# Patient Record
Sex: FEMALE | Race: OTHER | NOT HISPANIC OR LATINO | URBAN - METROPOLITAN AREA
[De-identification: names, ages, dates, MRNs, and addresses within clinical notes are randomized per-mention and may not be internally consistent; named-entity substitution may affect disease eponyms.]

---

## 2017-07-05 NOTE — PATIENT PROFILE ADULT. - PSH
H/O tubal ligation    History of cholecystectomy    History of surgery  elbow  History of umbilical hernia repair

## 2017-07-06 ENCOUNTER — INPATIENT (INPATIENT)
Facility: HOSPITAL | Age: 47
LOS: 3 days | Discharge: HOME | DRG: 330 | End: 2017-07-10
Attending: SURGERY | Admitting: SURGERY
Payer: COMMERCIAL

## 2017-07-06 VITALS
DIASTOLIC BLOOD PRESSURE: 83 MMHG | RESPIRATION RATE: 16 BRPM | HEART RATE: 70 BPM | OXYGEN SATURATION: 99 % | SYSTOLIC BLOOD PRESSURE: 139 MMHG | HEIGHT: 65 IN | WEIGHT: 144.18 LBS | TEMPERATURE: 98 F

## 2017-07-06 DIAGNOSIS — K57.92 DIVERTICULITIS OF INTESTINE, PART UNSPECIFIED, WITHOUT PERFORATION OR ABSCESS WITHOUT BLEEDING: ICD-10-CM

## 2017-07-06 DIAGNOSIS — Z98.51 TUBAL LIGATION STATUS: Chronic | ICD-10-CM

## 2017-07-06 DIAGNOSIS — Z98.890 OTHER SPECIFIED POSTPROCEDURAL STATES: Chronic | ICD-10-CM

## 2017-07-06 DIAGNOSIS — Z90.49 ACQUIRED ABSENCE OF OTHER SPECIFIED PARTS OF DIGESTIVE TRACT: Chronic | ICD-10-CM

## 2017-07-06 LAB
ALBUMIN SERPL ELPH-MCNC: 3.4 G/DL — SIGNIFICANT CHANGE UP (ref 3.3–5)
ALP SERPL-CCNC: 41 U/L — SIGNIFICANT CHANGE UP (ref 40–120)
ALT FLD-CCNC: 16 U/L — SIGNIFICANT CHANGE UP (ref 10–45)
ANION GAP SERPL CALC-SCNC: 10 MMOL/L — SIGNIFICANT CHANGE UP (ref 5–17)
AST SERPL-CCNC: 26 U/L — SIGNIFICANT CHANGE UP (ref 10–40)
BILIRUB SERPL-MCNC: 0.8 MG/DL — SIGNIFICANT CHANGE UP (ref 0.2–1.2)
BUN SERPL-MCNC: 7 MG/DL — SIGNIFICANT CHANGE UP (ref 7–23)
CALCIUM SERPL-MCNC: 8.5 MG/DL — SIGNIFICANT CHANGE UP (ref 8.4–10.5)
CHLORIDE SERPL-SCNC: 101 MMOL/L — SIGNIFICANT CHANGE UP (ref 96–108)
CO2 SERPL-SCNC: 25 MMOL/L — SIGNIFICANT CHANGE UP (ref 22–31)
CREAT SERPL-MCNC: 0.7 MG/DL — SIGNIFICANT CHANGE UP (ref 0.5–1.3)
GLUCOSE SERPL-MCNC: 138 MG/DL — HIGH (ref 70–99)
HCT VFR BLD CALC: 38.1 % — SIGNIFICANT CHANGE UP (ref 34.5–45)
HGB BLD-MCNC: 12.9 G/DL — SIGNIFICANT CHANGE UP (ref 11.5–15.5)
MAGNESIUM SERPL-MCNC: 1.7 MG/DL — SIGNIFICANT CHANGE UP (ref 1.6–2.6)
MCHC RBC-ENTMCNC: 30.2 PG — SIGNIFICANT CHANGE UP (ref 27–34)
MCHC RBC-ENTMCNC: 33.9 G/DL — SIGNIFICANT CHANGE UP (ref 32–36)
MCV RBC AUTO: 89.2 FL — SIGNIFICANT CHANGE UP (ref 80–100)
PHOSPHATE SERPL-MCNC: 2.9 MG/DL — SIGNIFICANT CHANGE UP (ref 2.5–4.5)
PLATELET # BLD AUTO: 195 K/UL — SIGNIFICANT CHANGE UP (ref 150–400)
POTASSIUM SERPL-MCNC: 4.1 MMOL/L — SIGNIFICANT CHANGE UP (ref 3.5–5.3)
POTASSIUM SERPL-SCNC: 4.1 MMOL/L — SIGNIFICANT CHANGE UP (ref 3.5–5.3)
PROT SERPL-MCNC: 5.5 G/DL — LOW (ref 6–8.3)
RBC # BLD: 4.27 M/UL — SIGNIFICANT CHANGE UP (ref 3.8–5.2)
RBC # FLD: 12.2 % — SIGNIFICANT CHANGE UP (ref 10.3–16.9)
SODIUM SERPL-SCNC: 136 MMOL/L — SIGNIFICANT CHANGE UP (ref 135–145)
WBC # BLD: 13.5 K/UL — HIGH (ref 3.8–10.5)
WBC # FLD AUTO: 13.5 K/UL — HIGH (ref 3.8–10.5)

## 2017-07-06 RX ORDER — HEPARIN SODIUM 5000 [USP'U]/ML
5000 INJECTION INTRAVENOUS; SUBCUTANEOUS EVERY 8 HOURS
Qty: 0 | Refills: 0 | Status: DISCONTINUED | OUTPATIENT
Start: 2017-07-07 | End: 2017-07-10

## 2017-07-06 RX ORDER — PIPERACILLIN AND TAZOBACTAM 4; .5 G/20ML; G/20ML
INJECTION, POWDER, LYOPHILIZED, FOR SOLUTION INTRAVENOUS
Qty: 0 | Refills: 0 | Status: DISCONTINUED | OUTPATIENT
Start: 2017-07-06 | End: 2017-07-06

## 2017-07-06 RX ORDER — ONDANSETRON 8 MG/1
4 TABLET, FILM COATED ORAL EVERY 6 HOURS
Qty: 0 | Refills: 0 | Status: DISCONTINUED | OUTPATIENT
Start: 2017-07-06 | End: 2017-07-10

## 2017-07-06 RX ORDER — HYDROMORPHONE HYDROCHLORIDE 2 MG/ML
30 INJECTION INTRAMUSCULAR; INTRAVENOUS; SUBCUTANEOUS
Qty: 0 | Refills: 0 | Status: DISCONTINUED | OUTPATIENT
Start: 2017-07-06 | End: 2017-07-08

## 2017-07-06 RX ORDER — SODIUM CHLORIDE 9 MG/ML
1000 INJECTION, SOLUTION INTRAVENOUS
Qty: 0 | Refills: 0 | Status: DISCONTINUED | OUTPATIENT
Start: 2017-07-06 | End: 2017-07-10

## 2017-07-06 RX ORDER — PIPERACILLIN AND TAZOBACTAM 4; .5 G/20ML; G/20ML
3.38 INJECTION, POWDER, LYOPHILIZED, FOR SOLUTION INTRAVENOUS ONCE
Qty: 0 | Refills: 0 | Status: COMPLETED | OUTPATIENT
Start: 2017-07-06 | End: 2017-07-06

## 2017-07-06 RX ORDER — PIPERACILLIN AND TAZOBACTAM 4; .5 G/20ML; G/20ML
3.38 INJECTION, POWDER, LYOPHILIZED, FOR SOLUTION INTRAVENOUS EVERY 6 HOURS
Qty: 0 | Refills: 0 | Status: DISCONTINUED | OUTPATIENT
Start: 2017-07-07 | End: 2017-07-10

## 2017-07-06 RX ORDER — MAGNESIUM SULFATE 500 MG/ML
1 VIAL (ML) INJECTION ONCE
Qty: 0 | Refills: 0 | Status: COMPLETED | OUTPATIENT
Start: 2017-07-06 | End: 2017-07-07

## 2017-07-06 RX ORDER — NALOXONE HYDROCHLORIDE 4 MG/.1ML
0.1 SPRAY NASAL
Qty: 0 | Refills: 0 | Status: DISCONTINUED | OUTPATIENT
Start: 2017-07-06 | End: 2017-07-10

## 2017-07-06 RX ORDER — SODIUM CHLORIDE 9 MG/ML
1000 INJECTION, SOLUTION INTRAVENOUS ONCE
Qty: 0 | Refills: 0 | Status: COMPLETED | OUTPATIENT
Start: 2017-07-06 | End: 2017-07-06

## 2017-07-06 RX ORDER — PIPERACILLIN AND TAZOBACTAM 4; .5 G/20ML; G/20ML
INJECTION, POWDER, LYOPHILIZED, FOR SOLUTION INTRAVENOUS
Qty: 0 | Refills: 0 | Status: DISCONTINUED | OUTPATIENT
Start: 2017-07-06 | End: 2017-07-10

## 2017-07-06 RX ADMIN — SODIUM CHLORIDE 1 MILLILITER(S): 9 INJECTION, SOLUTION INTRAVENOUS at 20:57

## 2017-07-06 RX ADMIN — PIPERACILLIN AND TAZOBACTAM 200 GRAM(S): 4; .5 INJECTION, POWDER, LYOPHILIZED, FOR SOLUTION INTRAVENOUS at 20:58

## 2017-07-06 NOTE — BRIEF OPERATIVE NOTE - POST-OP DX
Diverticulitis  07/06/2017    Active  Varghese, Rina GAMBLE  Meckels diverticulum  07/06/2017    Active  Varghese, Rina GAMBLE

## 2017-07-06 NOTE — H&P ADULT - HISTORY OF PRESENT ILLNESS
47 yo F with recurrent bouts of diverticulitis that have failed medical management presents for elective sigmoidectomy

## 2017-07-06 NOTE — BRIEF OPERATIVE NOTE - OPERATION/FINDINGS
Procedure: laparoscopic assisted sigmoidectomy with colorectal anastomosis, small bowel resection with side to side functional end to end anastomosis    Findings: inflammation of sigmoid, retained stone (likely from cholecystectomy), meckels diverticulum.  Anastomosis evaluated with proctoscopy- no air leak

## 2017-07-07 LAB
ANION GAP SERPL CALC-SCNC: 11 MMOL/L — SIGNIFICANT CHANGE UP (ref 5–17)
BUN SERPL-MCNC: 6 MG/DL — LOW (ref 7–23)
CALCIUM SERPL-MCNC: 8.5 MG/DL — SIGNIFICANT CHANGE UP (ref 8.4–10.5)
CHLORIDE SERPL-SCNC: 100 MMOL/L — SIGNIFICANT CHANGE UP (ref 96–108)
CO2 SERPL-SCNC: 25 MMOL/L — SIGNIFICANT CHANGE UP (ref 22–31)
CREAT SERPL-MCNC: 0.8 MG/DL — SIGNIFICANT CHANGE UP (ref 0.5–1.3)
GLUCOSE SERPL-MCNC: 112 MG/DL — HIGH (ref 70–99)
HCT VFR BLD CALC: 37.6 % — SIGNIFICANT CHANGE UP (ref 34.5–45)
HGB BLD-MCNC: 12.9 G/DL — SIGNIFICANT CHANGE UP (ref 11.5–15.5)
MAGNESIUM SERPL-MCNC: 2 MG/DL — SIGNIFICANT CHANGE UP (ref 1.6–2.6)
MCHC RBC-ENTMCNC: 30.6 PG — SIGNIFICANT CHANGE UP (ref 27–34)
MCHC RBC-ENTMCNC: 34.3 G/DL — SIGNIFICANT CHANGE UP (ref 32–36)
MCV RBC AUTO: 89.1 FL — SIGNIFICANT CHANGE UP (ref 80–100)
PHOSPHATE SERPL-MCNC: 2.7 MG/DL — SIGNIFICANT CHANGE UP (ref 2.5–4.5)
PLATELET # BLD AUTO: 189 K/UL — SIGNIFICANT CHANGE UP (ref 150–400)
POTASSIUM SERPL-MCNC: 4.2 MMOL/L — SIGNIFICANT CHANGE UP (ref 3.5–5.3)
POTASSIUM SERPL-SCNC: 4.2 MMOL/L — SIGNIFICANT CHANGE UP (ref 3.5–5.3)
RBC # BLD: 4.22 M/UL — SIGNIFICANT CHANGE UP (ref 3.8–5.2)
RBC # FLD: 12.8 % — SIGNIFICANT CHANGE UP (ref 10.3–16.9)
SODIUM SERPL-SCNC: 136 MMOL/L — SIGNIFICANT CHANGE UP (ref 135–145)
WBC # BLD: 12.5 K/UL — HIGH (ref 3.8–10.5)
WBC # FLD AUTO: 12.5 K/UL — HIGH (ref 3.8–10.5)

## 2017-07-07 RX ORDER — ACETAMINOPHEN 500 MG
1000 TABLET ORAL ONCE
Qty: 0 | Refills: 0 | Status: COMPLETED | OUTPATIENT
Start: 2017-07-07 | End: 2017-07-07

## 2017-07-07 RX ORDER — KETOROLAC TROMETHAMINE 30 MG/ML
30 SYRINGE (ML) INJECTION EVERY 6 HOURS
Qty: 0 | Refills: 0 | Status: DISCONTINUED | OUTPATIENT
Start: 2017-07-07 | End: 2017-07-10

## 2017-07-07 RX ADMIN — PIPERACILLIN AND TAZOBACTAM 200 GRAM(S): 4; .5 INJECTION, POWDER, LYOPHILIZED, FOR SOLUTION INTRAVENOUS at 07:59

## 2017-07-07 RX ADMIN — Medication 30 MILLIGRAM(S): at 18:16

## 2017-07-07 RX ADMIN — Medication 1000 MILLIGRAM(S): at 07:58

## 2017-07-07 RX ADMIN — PIPERACILLIN AND TAZOBACTAM 200 GRAM(S): 4; .5 INJECTION, POWDER, LYOPHILIZED, FOR SOLUTION INTRAVENOUS at 20:02

## 2017-07-07 RX ADMIN — HYDROMORPHONE HYDROCHLORIDE 30 MILLILITER(S): 2 INJECTION INTRAMUSCULAR; INTRAVENOUS; SUBCUTANEOUS at 17:09

## 2017-07-07 RX ADMIN — ONDANSETRON 4 MILLIGRAM(S): 8 TABLET, FILM COATED ORAL at 05:03

## 2017-07-07 RX ADMIN — HEPARIN SODIUM 5000 UNIT(S): 5000 INJECTION INTRAVENOUS; SUBCUTANEOUS at 05:03

## 2017-07-07 RX ADMIN — HEPARIN SODIUM 5000 UNIT(S): 5000 INJECTION INTRAVENOUS; SUBCUTANEOUS at 23:11

## 2017-07-07 RX ADMIN — Medication 30 MILLIGRAM(S): at 23:25

## 2017-07-07 RX ADMIN — Medication 400 MILLIGRAM(S): at 07:43

## 2017-07-07 RX ADMIN — PIPERACILLIN AND TAZOBACTAM 200 GRAM(S): 4; .5 INJECTION, POWDER, LYOPHILIZED, FOR SOLUTION INTRAVENOUS at 01:54

## 2017-07-07 RX ADMIN — SODIUM CHLORIDE 150 MILLILITER(S): 9 INJECTION, SOLUTION INTRAVENOUS at 05:03

## 2017-07-07 RX ADMIN — HEPARIN SODIUM 5000 UNIT(S): 5000 INJECTION INTRAVENOUS; SUBCUTANEOUS at 13:57

## 2017-07-07 RX ADMIN — Medication 30 MILLIGRAM(S): at 18:01

## 2017-07-07 RX ADMIN — ONDANSETRON 4 MILLIGRAM(S): 8 TABLET, FILM COATED ORAL at 15:53

## 2017-07-07 RX ADMIN — Medication 30 MILLIGRAM(S): at 23:11

## 2017-07-07 RX ADMIN — SODIUM CHLORIDE 150 MILLILITER(S): 9 INJECTION, SOLUTION INTRAVENOUS at 00:14

## 2017-07-07 RX ADMIN — Medication 100 GRAM(S): at 00:04

## 2017-07-07 RX ADMIN — PIPERACILLIN AND TAZOBACTAM 200 GRAM(S): 4; .5 INJECTION, POWDER, LYOPHILIZED, FOR SOLUTION INTRAVENOUS at 13:57

## 2017-07-07 RX ADMIN — Medication 400 MILLIGRAM(S): at 18:01

## 2017-07-07 NOTE — PROGRESS NOTE ADULT - SUBJECTIVE AND OBJECTIVE BOX
O/N:  POC wnl. low UO, gave 1L Bolus. inc IVF @ 150.  UO picked up. ordered SQH for am. postop labs as per Dr. Charles WBC 13.5 but otherwise wnl. T.max 100.2 encouraged IS.  6/6: s/p Lap sig and SBR    status post: lap sig and SBR    POD #1 O/N:  POC wnl. low UO, gave 1L Bolus. inc IVF @ 150.  UO picked up. ordered SQH for am. postop labs as per Dr. Charles WBC 13.5 but otherwise wnl. T.max 100.2 encouraged IS.  6/6: s/p Lap sig and SBR    status post: lap sig and SBR    POD #1     SUBJECTIVE: Patient seen and examined bedside by chief resident. C/o some mild abdominal pain.     piperacillin/tazobactam IVPB.   IV Intermittent   piperacillin/tazobactam IVPB. 3.375 Gram(s) IV Intermittent every 6 hours  heparin  Injectable 5000 Unit(s) SubCutaneous every 8 hours      Vital Signs Last 24 Hrs  T(C): 37.3 (07 Jul 2017 09:23), Max: 37.9 (07 Jul 2017 00:53)  T(F): 99.1 (07 Jul 2017 09:23), Max: 100.2 (07 Jul 2017 00:53)  HR: 64 (07 Jul 2017 09:23) (64 - 81)  BP: 141/80 (07 Jul 2017 09:23) (126/69 - 141/80)  BP(mean): --  RR: 17 (07 Jul 2017 09:23) (16 - 17)  SpO2: 98% (07 Jul 2017 09:23) (98% - 99%)  I&O's Detail    06 Jul 2017 07:01  -  07 Jul 2017 07:00  --------------------------------------------------------  IN:    IV PiggyBack: 250 mL    Lactated Ringers IV Bolus: 1000 mL    lactated ringers.: 1650 mL  Total IN: 2900 mL    OUT:    Indwelling Catheter - Urethral: 650 mL  Total OUT: 650 mL    Total NET: 2250 mL      General: NAD, resting comfortably in bed  C/V: NSR  Pulm: Nonlabored breathing, no respiratory distress  Abd: soft, NT/ND. Incisions are C/D/I  Extrem: WWP, no edema, SCDs in place        LABS:                        12.9   12.5  )-----------( 189      ( 07 Jul 2017 06:46 )             37.6     07-07    136  |  100  |  6<L>  ----------------------------<  112<H>  4.2   |  25  |  0.80    Ca    8.5      07 Jul 2017 06:46  Phos  2.7     07-07  Mg     2.0     07-07    TPro  5.5<L>  /  Alb  3.4  /  TBili  0.8  /  DBili  x   /  AST  26  /  ALT  16  /  AlkPhos  41  07-06

## 2017-07-07 NOTE — PROGRESS NOTE ADULT - ASSESSMENT
47 y/o F s/p Lap sig and SBR    NPO/IVF  PCA  nausea control  Iman Schaefer to gravity  AM labs  Start HSQ 7/7  OOBA/IS

## 2017-07-07 NOTE — PROGRESS NOTE ADULT - SUBJECTIVE AND OBJECTIVE BOX
INTERVAL HPI/OVERNIGHT EVENTS:   SURGERY ATTENDING    STATUS POST:  LAPAROSCOPIC MOBILISATION OF SPLENIC FLEXURE, SIGMOIDECTOMY WITH LOW COLORECTAL ANASTOMOSIS, RIGID PROCTOCOLONOSCOPY, SMALL BOWEL RESECTION WITH ANASTOMOSIS.    POST OPERATIVE DAY #: 1    SUBJECTIVE:  Flatus: [ ] YES [X ] NO             Bowel Movement: [ ] YES [X ] NO  Pain (0-10):       5     Pain Control Adequate: [X ] YES [ ] NO  Nausea: [ ] YES [X ] NO            Vomiting: [ ] YES [X ] NO  Diarrhea: [ ] YES [X ] NO         Constipation: [ ] YES [X ] NO     Chest Pain: [ ] YES [X ] NO    SOB:  [ ] YES [X ] NO    MEDICATIONS  (STANDING):  piperacillin/tazobactam IVPB.   IV Intermittent   piperacillin/tazobactam IVPB. 3.375 Gram(s) IV Intermittent every 6 hours  lactated ringers. 1000 milliLiter(s) (125 mL/Hr) IV Continuous <Continuous>  HYDROmorphone PCA (1 mG/mL) 30 milliLiter(s) PCA Continuous PCA Continuous  heparin  Injectable 5000 Unit(s) SubCutaneous every 8 hours  acetaminophen  IVPB. 1000 milliGRAM(s) IV Intermittent once  ketorolac   Injectable 30 milliGRAM(s) IV Push every 6 hours    MEDICATIONS  (PRN):  ondansetron Injectable 4 milliGRAM(s) IV Push every 6 hours PRN Nausea  naloxone Injectable 0.1 milliGRAM(s) IV Push every 3 minutes PRN For ANY of the following changes in patient status:  A. RR LESS THAN 10 breaths per minute, B. Oxygen saturation LESS THAN 90%, C. Sedation score of 6      Vital Signs Last 24 Hrs  T(C): 37.6 (07 Jul 2017 17:12), Max: 37.9 (07 Jul 2017 00:53)  T(F): 99.7 (07 Jul 2017 17:12), Max: 100.2 (07 Jul 2017 00:53)  HR: 66 (07 Jul 2017 17:12) (64 - 81)  BP: 151/87 (07 Jul 2017 17:12) (126/69 - 151/87)  BP(mean): --  RR: 17 (07 Jul 2017 17:12) (16 - 17)  SpO2: 98% (07 Jul 2017 17:12) (98% - 99%)          I&O's Detail    06 Jul 2017 07:01  -  07 Jul 2017 07:00  --------------------------------------------------------  IN:    IV PiggyBack: 250 mL    Lactated Ringers IV Bolus: 1000 mL    lactated ringers.: 1650 mL  Total IN: 2900 mL    OUT:    Indwelling Catheter - Urethral: 650 mL  Total OUT: 650 mL    Total NET: 2250 mL      07 Jul 2017 07:01  -  07 Jul 2017 17:37  --------------------------------------------------------  IN:  Total IN: 0 mL    OUT:    Indwelling Catheter - Urethral: 1725 mL  Total OUT: 1725 mL    Total NET: -1725 mL          LABS:                        12.9   12.5  )-----------( 189      ( 07 Jul 2017 06:46 )             37.6     07-07    136  |  100  |  6<L>  ----------------------------<  112<H>  4.2   |  25  |  0.80    Ca    8.5      07 Jul 2017 06:46  Phos  2.7     07-07  Mg     2.0     07-07    TPro  5.5<L>  /  Alb  3.4  /  TBili  0.8  /  DBili  x   /  AST  26  /  ALT  16  /  AlkPhos  41  07-06          RADIOLOGY & ADDITIONAL STUDIES:

## 2017-07-07 NOTE — PROGRESS NOTE ADULT - ASSESSMENT
ABD SOFT, BS-, FLATUS-, BM-, WOUNDS DRY CLEAN INTACT.  NPO, IVF, IV ABX, DVT PROFILAXIS, SPIROMETRY, OOB.

## 2017-07-08 LAB
ANION GAP SERPL CALC-SCNC: 14 MMOL/L — SIGNIFICANT CHANGE UP (ref 5–17)
BUN SERPL-MCNC: 7 MG/DL — SIGNIFICANT CHANGE UP (ref 7–23)
CALCIUM SERPL-MCNC: 8.7 MG/DL — SIGNIFICANT CHANGE UP (ref 8.4–10.5)
CHLORIDE SERPL-SCNC: 99 MMOL/L — SIGNIFICANT CHANGE UP (ref 96–108)
CO2 SERPL-SCNC: 25 MMOL/L — SIGNIFICANT CHANGE UP (ref 22–31)
CREAT SERPL-MCNC: 0.7 MG/DL — SIGNIFICANT CHANGE UP (ref 0.5–1.3)
GLUCOSE SERPL-MCNC: 121 MG/DL — HIGH (ref 70–99)
HCT VFR BLD CALC: 38.1 % — SIGNIFICANT CHANGE UP (ref 34.5–45)
HGB BLD-MCNC: 13.3 G/DL — SIGNIFICANT CHANGE UP (ref 11.5–15.5)
MAGNESIUM SERPL-MCNC: 1.8 MG/DL — SIGNIFICANT CHANGE UP (ref 1.6–2.6)
MCHC RBC-ENTMCNC: 30.9 PG — SIGNIFICANT CHANGE UP (ref 27–34)
MCHC RBC-ENTMCNC: 34.9 G/DL — SIGNIFICANT CHANGE UP (ref 32–36)
MCV RBC AUTO: 88.6 FL — SIGNIFICANT CHANGE UP (ref 80–100)
PHOSPHATE SERPL-MCNC: 1.9 MG/DL — LOW (ref 2.5–4.5)
PLATELET # BLD AUTO: 178 K/UL — SIGNIFICANT CHANGE UP (ref 150–400)
POTASSIUM SERPL-MCNC: 3.6 MMOL/L — SIGNIFICANT CHANGE UP (ref 3.5–5.3)
POTASSIUM SERPL-SCNC: 3.6 MMOL/L — SIGNIFICANT CHANGE UP (ref 3.5–5.3)
RBC # BLD: 4.3 M/UL — SIGNIFICANT CHANGE UP (ref 3.8–5.2)
RBC # FLD: 12.6 % — SIGNIFICANT CHANGE UP (ref 10.3–16.9)
SODIUM SERPL-SCNC: 138 MMOL/L — SIGNIFICANT CHANGE UP (ref 135–145)
WBC # BLD: 11.6 K/UL — HIGH (ref 3.8–10.5)
WBC # FLD AUTO: 11.6 K/UL — HIGH (ref 3.8–10.5)

## 2017-07-08 PROCEDURE — 71010: CPT | Mod: 26

## 2017-07-08 PROCEDURE — 74010: CPT | Mod: 26

## 2017-07-08 RX ORDER — TETRACAINE/BENZOCAINE/BUTAMBEN 2%-14%-2%
1 OINTMENT (GRAM) TOPICAL ONCE
Qty: 0 | Refills: 0 | Status: COMPLETED | OUTPATIENT
Start: 2017-07-08 | End: 2017-07-08

## 2017-07-08 RX ORDER — BENZOCAINE AND MENTHOL 5; 1 G/100ML; G/100ML
1 LIQUID ORAL ONCE
Qty: 0 | Refills: 0 | Status: COMPLETED | OUTPATIENT
Start: 2017-07-08 | End: 2017-07-08

## 2017-07-08 RX ADMIN — PIPERACILLIN AND TAZOBACTAM 200 GRAM(S): 4; .5 INJECTION, POWDER, LYOPHILIZED, FOR SOLUTION INTRAVENOUS at 19:24

## 2017-07-08 RX ADMIN — PIPERACILLIN AND TAZOBACTAM 200 GRAM(S): 4; .5 INJECTION, POWDER, LYOPHILIZED, FOR SOLUTION INTRAVENOUS at 01:24

## 2017-07-08 RX ADMIN — ONDANSETRON 4 MILLIGRAM(S): 8 TABLET, FILM COATED ORAL at 10:48

## 2017-07-08 RX ADMIN — PIPERACILLIN AND TAZOBACTAM 200 GRAM(S): 4; .5 INJECTION, POWDER, LYOPHILIZED, FOR SOLUTION INTRAVENOUS at 07:03

## 2017-07-08 RX ADMIN — SODIUM CHLORIDE 150 MILLILITER(S): 9 INJECTION, SOLUTION INTRAVENOUS at 13:24

## 2017-07-08 RX ADMIN — BENZOCAINE AND MENTHOL 1 LOZENGE: 5; 1 LIQUID ORAL at 18:25

## 2017-07-08 RX ADMIN — HEPARIN SODIUM 5000 UNIT(S): 5000 INJECTION INTRAVENOUS; SUBCUTANEOUS at 05:34

## 2017-07-08 RX ADMIN — PIPERACILLIN AND TAZOBACTAM 200 GRAM(S): 4; .5 INJECTION, POWDER, LYOPHILIZED, FOR SOLUTION INTRAVENOUS at 13:24

## 2017-07-08 RX ADMIN — Medication 30 MILLIGRAM(S): at 19:39

## 2017-07-08 RX ADMIN — Medication 30 MILLIGRAM(S): at 12:30

## 2017-07-08 RX ADMIN — ONDANSETRON 4 MILLIGRAM(S): 8 TABLET, FILM COATED ORAL at 04:57

## 2017-07-08 RX ADMIN — HEPARIN SODIUM 5000 UNIT(S): 5000 INJECTION INTRAVENOUS; SUBCUTANEOUS at 13:24

## 2017-07-08 RX ADMIN — Medication 30 MILLIGRAM(S): at 23:14

## 2017-07-08 RX ADMIN — HEPARIN SODIUM 5000 UNIT(S): 5000 INJECTION INTRAVENOUS; SUBCUTANEOUS at 23:14

## 2017-07-08 RX ADMIN — Medication 30 MILLIGRAM(S): at 23:30

## 2017-07-08 RX ADMIN — Medication 30 MILLIGRAM(S): at 05:45

## 2017-07-08 RX ADMIN — Medication 1 SPRAY(S): at 12:15

## 2017-07-08 RX ADMIN — Medication 30 MILLIGRAM(S): at 12:15

## 2017-07-08 RX ADMIN — Medication 30 MILLIGRAM(S): at 05:34

## 2017-07-08 NOTE — PROGRESS NOTE ADULT - SUBJECTIVE AND OBJECTIVE BOX
INTERVAL HPI/OVERNIGHT EVENTS:   SURGERY ATTENDING    STATUS POST:      LAPAROSCOPIC MOBILISATION OF SPLENIC FLEXURE, SIGMOIDECTOMY WITH LOW COLORECTAL ANASTOMOSIS, RIGID PROCTOCOLONOSCOPY, SMALL BOWEL RESECTION WITH ANASTOMOSIS.        POST OPERATIVE DAY #: 2    SUBJECTIVE:  Flatus: [ ] YES [X ] NO             Bowel Movement: [ ] YES [X ] NO  Pain (0-10):        3    Pain Control Adequate: [X ] YES [ ] NO  Nausea: [ ] YES [ X] NO            Vomiting: [ ] YES [X ] NO  Diarrhea: [ ] YES [ X] NO         Constipation: [ ] YES [X ] NO     Chest Pain: [ ] YES [X ] NO    SOB:  [ ] YES [X ] NO    MEDICATIONS  (STANDING):  piperacillin/tazobactam IVPB.   IV Intermittent   piperacillin/tazobactam IVPB. 3.375 Gram(s) IV Intermittent every 6 hours  lactated ringers. 1000 milliLiter(s) (150 mL/Hr) IV Continuous <Continuous>  heparin  Injectable 5000 Unit(s) SubCutaneous every 8 hours  ketorolac   Injectable 30 milliGRAM(s) IV Push every 6 hours  benzocaine 15 mG/menthol 3.6 mG Lozenge 1 Lozenge Oral once    MEDICATIONS  (PRN):  ondansetron Injectable 4 milliGRAM(s) IV Push every 6 hours PRN Nausea  naloxone Injectable 0.1 milliGRAM(s) IV Push every 3 minutes PRN For ANY of the following changes in patient status:  A. RR LESS THAN 10 breaths per minute, B. Oxygen saturation LESS THAN 90%, C. Sedation score of 6      Vital Signs Last 24 Hrs  T(C): 37.2 (08 Jul 2017 12:54), Max: 37.6 (07 Jul 2017 17:12)  T(F): 98.9 (08 Jul 2017 12:54), Max: 99.7 (07 Jul 2017 17:12)  HR: 77 (08 Jul 2017 12:54) (66 - 77)  BP: 108/67 (08 Jul 2017 12:54) (108/67 - 151/87)  BP(mean): --  RR: 16 (08 Jul 2017 12:54) (16 - 17)  SpO2: 95% (08 Jul 2017 12:54) (95% - 99%)    PHYSICAL EXAM:      Constitutional:    Eyes:    ENMT:    Neck:    Breasts:    Back:    Respiratory:    Cardiovascular:    Gastrointestinal:    Genitourinary:    Rectal:    Extremities:    Vascular:    Neurological:    Skin:    Lymph Nodes:    Musculoskeletal:    Psychiatric:        I&O's Detail    07 Jul 2017 07:01  -  08 Jul 2017 07:00  --------------------------------------------------------  IN:    lactated ringers.: 3600 mL  Total IN: 3600 mL    OUT:    Emesis: 1800 mL    Indwelling Catheter - Urethral: 2675 mL  Total OUT: 4475 mL    Total NET: -875 mL          LABS:                        13.3   11.6  )-----------( 178      ( 08 Jul 2017 12:11 )             38.1     07-08    138  |  99  |  7   ----------------------------<  121<H>  3.6   |  25  |  0.70    Ca    8.7      08 Jul 2017 12:11  Phos  1.9     07-08  Mg     1.8     07-08    TPro  5.5<L>  /  Alb  3.4  /  TBili  0.8  /  DBili  x   /  AST  26  /  ALT  16  /  AlkPhos  41  07-06          RADIOLOGY & ADDITIONAL STUDIES:

## 2017-07-08 NOTE — DIETITIAN INITIAL EVALUATION ADULT. - ENERGY NEEDS
Height: 65" Weight: 144lbs, IBW 125lbs+/-10%, %%, BMI - 24  ABW used to calculate energy needs due to pt's current body weight within % IBW   Nutrient needs based on [facility] standards of care for maintenance in adults, protein adjusted 2/2 surgery

## 2017-07-08 NOTE — PROGRESS NOTE ADULT - ASSESSMENT
46F POD 2 lap sigmoidectomy with bilious emesis and ileus vs partial SBO seen on AXR. NGT placed, yielding an immediate 500cc of stomach contents. Pt had flatus thereafter.    NGT and culver d/c'd  NPO  SCDs  If BM, can be d/c'd Monday 46F POD 2 lap sigmoidectomy with bilious emesis and ileus vs partial SBO seen on AXR. NGT placed, yielding an immediate 500cc of stomach contents. Pt had flatus thereafter.    NGT and culver d/c'd  NPO  Zosyn  SCDs  If BM, can be d/c'd Monday

## 2017-07-08 NOTE — PROGRESS NOTE ADULT - ASSESSMENT
45 y/o F s/p Lap sig and SBR    NPO/IVF  Toradol standing  nausea control  Iman Schaefer to gravity  AM labs  HSQ  OOBA/IS  possible NG placement  f/u AXR

## 2017-07-08 NOTE — DIETITIAN INITIAL EVALUATION ADULT. - OTHER INFO
Pt s/p elective sigmoidectomy and small bowel resection (for incidental Meckel's diverticulum), POD 2. Currently NPO; had vomiting overnight; NGT placed. NGT now removed. Pt reports to have improvement in symptoms after NGT. Pt with hx of diverticulosis; unaware of diet management of disease. Educated on low fibr diet and progression to high fiber diet as tolerated. Denies any wt changes. +flatus, -BM. NKFA.

## 2017-07-08 NOTE — PROGRESS NOTE ADULT - SUBJECTIVE AND OBJECTIVE BOX
O/N: Vomitted bile x2 as per nurse ~800cc. abdomen Soft, mildly Distended and tender.  no BF, standing toradol for pain. OOBA. IS 1L  7/7: Tylenol 1 g IV given for breakthrough pain. Started HSQ.    Status post Lap sig and SBR    pod # 2 46 Y/F with recurrent bouts of diverticulitis that failed medical management s/p elective sigmoidectomy and small bowel resection (for incidental Meckel's diverticulum), POD 2.    7/8: 1 episode of bilious vomiting this morning; total of 3 today. Abdominal x-ray confirmed ileus; cannot exclude partial SBO. NGT placed with bilious overflow. CXR confirmed placement of NGT in stomach. NGT set to low intermittent wall suction. Patient reporting epigastric discomfort after placement of NGT, as well as diffuse abdominal pain while vomiting.  O/N: Vomited bile x2 as per nurse ~800cc. abdomen Soft, mildly Distended and tender.  no BF, standing toradol for pain. OOBA. IS 1L  7/7: Tylenol 1 g IV given for breakthrough pain. Started HSQ.    Physical Exam:  General - Patient sitting up in bed in no acute distress.  Abdomen - No bowel sounds. Soft, tender, distended. Laparoscopic incisions are covered with steri strips; dry and intact. 2 steri strips have minimal, dried drops of serosanguinous fluid; the rest are clean. 46 Y/F with recurrent bouts of diverticulitis that failed medical management s/p elective sigmoidectomy and small bowel resection (for incidental Meckel's diverticulum), POD 2.    7/8: 1 episode of bilious vomiting this morning; total of 3 today. Abdominal x-ray confirmed ileus; cannot exclude partial SBO. NGT placed with bilious overflow. CXR confirmed placement of NGT in stomach. NGT set to low intermittent wall suction. Patient reported epigastric discomfort after placement of NGT, as well as diffuse abdominal pain while vomiting - currently resolved.  O/N: Vomited bile x2 as per nurse ~800cc. abdomen Soft, mildly Distended and tender.  no BF, standing toradol for pain. OOBA. IS 1L  7/7: Tylenol 1 g IV given for breakthrough pain. Started HSQ.    Physical Exam:  General - Patient sitting up in bed in no acute distress.  HEENT - NGT in place. CN II-XII grossly intact.  Abdomen - No bowel sounds. Soft, tender, distended. Laparoscopic incisions are covered with steri strips; dry and intact. 2 steri strips have minimal, dried drops of serosanguinous fluid; the rest are clean.  Extremities - No edema, cyanosis or clubbing.

## 2017-07-08 NOTE — PROGRESS NOTE ADULT - SUBJECTIVE AND OBJECTIVE BOX
STATUS POST:  POD2 elective sigmoidectomy     SUBJECTIVE: Patient seen and examined bedside by chief resident. Pt had 2 episodes of NBNB emesis amounting to approximately 600cc. Ill-appearing.      Vital Signs Last 24 Hrs  T(C): 36.8 (08 Jul 2017 20:37), Max: 37.3 (08 Jul 2017 01:00)  T(F): 98.3 (08 Jul 2017 20:37), Max: 99.2 (08 Jul 2017 01:00)  HR: 67 (08 Jul 2017 20:37) (67 - 77)  BP: 122/73 (08 Jul 2017 20:37) (108/67 - 131/76)  BP(mean): --  RR: 16 (08 Jul 2017 20:37) (16 - 18)  SpO2: 96% (08 Jul 2017 20:37) (95% - 98%)  I&O's Detail    07 Jul 2017 07:01  -  08 Jul 2017 07:00  --------------------------------------------------------  IN:    lactated ringers.: 3600 mL  Total IN: 3600 mL    OUT:    Emesis: 1800 mL    Indwelling Catheter - Urethral: 2675 mL  Total OUT: 4475 mL    Total NET: -875 mL      08 Jul 2017 07:01  -  08 Jul 2017 23:41  --------------------------------------------------------  IN:  Total IN: 0 mL    OUT:    Nasoenteral Tube: 500 mL    Voided: 200 mL  Total OUT: 700 mL    Total NET: -700 mL          General: Ill-appearing  Abd: Deferred as patient was not able to tolerate.      LABS:                        13.3   11.6  )-----------( 178      ( 08 Jul 2017 12:11 )             38.1     07-08    138  |  99  |  7   ----------------------------<  121<H>  3.6   |  25  |  0.70    Ca    8.7      08 Jul 2017 12:11  Phos  1.9     07-08  Mg     1.8     07-08

## 2017-07-09 LAB
ANION GAP SERPL CALC-SCNC: 13 MMOL/L — SIGNIFICANT CHANGE UP (ref 5–17)
BUN SERPL-MCNC: 11 MG/DL — SIGNIFICANT CHANGE UP (ref 7–23)
CALCIUM SERPL-MCNC: 8.5 MG/DL — SIGNIFICANT CHANGE UP (ref 8.4–10.5)
CHLORIDE SERPL-SCNC: 98 MMOL/L — SIGNIFICANT CHANGE UP (ref 96–108)
CO2 SERPL-SCNC: 25 MMOL/L — SIGNIFICANT CHANGE UP (ref 22–31)
CREAT SERPL-MCNC: 0.8 MG/DL — SIGNIFICANT CHANGE UP (ref 0.5–1.3)
GLUCOSE SERPL-MCNC: 105 MG/DL — HIGH (ref 70–99)
HCT VFR BLD CALC: 39 % — SIGNIFICANT CHANGE UP (ref 34.5–45)
HGB BLD-MCNC: 13.5 G/DL — SIGNIFICANT CHANGE UP (ref 11.5–15.5)
MAGNESIUM SERPL-MCNC: 1.8 MG/DL — SIGNIFICANT CHANGE UP (ref 1.6–2.6)
MCHC RBC-ENTMCNC: 31 PG — SIGNIFICANT CHANGE UP (ref 27–34)
MCHC RBC-ENTMCNC: 34.6 G/DL — SIGNIFICANT CHANGE UP (ref 32–36)
MCV RBC AUTO: 89.4 FL — SIGNIFICANT CHANGE UP (ref 80–100)
PHOSPHATE SERPL-MCNC: 2.2 MG/DL — LOW (ref 2.5–4.5)
PLATELET # BLD AUTO: 190 K/UL — SIGNIFICANT CHANGE UP (ref 150–400)
POTASSIUM SERPL-MCNC: 3.9 MMOL/L — SIGNIFICANT CHANGE UP (ref 3.5–5.3)
POTASSIUM SERPL-SCNC: 3.9 MMOL/L — SIGNIFICANT CHANGE UP (ref 3.5–5.3)
RBC # BLD: 4.36 M/UL — SIGNIFICANT CHANGE UP (ref 3.8–5.2)
RBC # FLD: 12.5 % — SIGNIFICANT CHANGE UP (ref 10.3–16.9)
SODIUM SERPL-SCNC: 136 MMOL/L — SIGNIFICANT CHANGE UP (ref 135–145)
WBC # BLD: 10.3 K/UL — SIGNIFICANT CHANGE UP (ref 3.8–10.5)
WBC # FLD AUTO: 10.3 K/UL — SIGNIFICANT CHANGE UP (ref 3.8–10.5)

## 2017-07-09 RX ORDER — POTASSIUM PHOSPHATE, MONOBASIC POTASSIUM PHOSPHATE, DIBASIC 236; 224 MG/ML; MG/ML
21 INJECTION, SOLUTION INTRAVENOUS ONCE
Qty: 0 | Refills: 0 | Status: COMPLETED | OUTPATIENT
Start: 2017-07-09 | End: 2017-07-09

## 2017-07-09 RX ORDER — MAGNESIUM SULFATE 500 MG/ML
1 VIAL (ML) INJECTION ONCE
Qty: 0 | Refills: 0 | Status: COMPLETED | OUTPATIENT
Start: 2017-07-09 | End: 2017-07-09

## 2017-07-09 RX ADMIN — Medication 30 MILLIGRAM(S): at 07:00

## 2017-07-09 RX ADMIN — PIPERACILLIN AND TAZOBACTAM 200 GRAM(S): 4; .5 INJECTION, POWDER, LYOPHILIZED, FOR SOLUTION INTRAVENOUS at 00:33

## 2017-07-09 RX ADMIN — Medication 30 MILLIGRAM(S): at 06:40

## 2017-07-09 RX ADMIN — Medication 30 MILLIGRAM(S): at 18:28

## 2017-07-09 RX ADMIN — HEPARIN SODIUM 5000 UNIT(S): 5000 INJECTION INTRAVENOUS; SUBCUTANEOUS at 23:23

## 2017-07-09 RX ADMIN — Medication 30 MILLIGRAM(S): at 12:30

## 2017-07-09 RX ADMIN — Medication 30 MILLIGRAM(S): at 19:13

## 2017-07-09 RX ADMIN — POTASSIUM PHOSPHATE, MONOBASIC POTASSIUM PHOSPHATE, DIBASIC 64.25 MILLIMOLE(S): 236; 224 INJECTION, SOLUTION INTRAVENOUS at 11:51

## 2017-07-09 RX ADMIN — Medication 30 MILLIGRAM(S): at 23:23

## 2017-07-09 RX ADMIN — Medication 30 MILLIGRAM(S): at 12:00

## 2017-07-09 RX ADMIN — Medication 100 GRAM(S): at 09:17

## 2017-07-09 RX ADMIN — HEPARIN SODIUM 5000 UNIT(S): 5000 INJECTION INTRAVENOUS; SUBCUTANEOUS at 14:14

## 2017-07-09 RX ADMIN — PIPERACILLIN AND TAZOBACTAM 200 GRAM(S): 4; .5 INJECTION, POWDER, LYOPHILIZED, FOR SOLUTION INTRAVENOUS at 06:40

## 2017-07-09 RX ADMIN — SODIUM CHLORIDE 150 MILLILITER(S): 9 INJECTION, SOLUTION INTRAVENOUS at 19:05

## 2017-07-09 RX ADMIN — PIPERACILLIN AND TAZOBACTAM 200 GRAM(S): 4; .5 INJECTION, POWDER, LYOPHILIZED, FOR SOLUTION INTRAVENOUS at 19:05

## 2017-07-09 RX ADMIN — Medication 30 MILLIGRAM(S): at 12:20

## 2017-07-09 RX ADMIN — PIPERACILLIN AND TAZOBACTAM 200 GRAM(S): 4; .5 INJECTION, POWDER, LYOPHILIZED, FOR SOLUTION INTRAVENOUS at 13:11

## 2017-07-09 RX ADMIN — Medication 30 MILLIGRAM(S): at 23:45

## 2017-07-09 RX ADMIN — HEPARIN SODIUM 5000 UNIT(S): 5000 INJECTION INTRAVENOUS; SUBCUTANEOUS at 06:40

## 2017-07-09 NOTE — PROGRESS NOTE ADULT - SUBJECTIVE AND OBJECTIVE BOX
INTERVAL HPI/OVERNIGHT EVENTS:   SURGERY ATTENDING    STATUS POST:      LAPAROSCOPIC MOBILISATION OF SPLENIC FLEXURE, SIGMOIDECTOMY WITH LOW COLORECTAL ANASTOMOSIS, RIGID PROCTOCOLONOSCOPY, SMALL BOWEL RESECTION WITH ANASTOMOSIS.    POST OPERATIVE DAY #: 3    SUBJECTIVE:  Flatus: [X ] YES [ ] NO             Bowel Movement: [X ] YES [ ] NO  Pain (0-10):      2      Pain Control Adequate: [X] YES [ ] NO  Nausea: [ ] YES [X ] NO            Vomiting: [ ] YES [X ] NO  Diarrhea: [ ] YES [X ] NO         Constipation: [ ] YES [X ] NO     Chest Pain: [ ] YES [X ] NO    SOB:  [ ] YES [X ] NO    MEDICATIONS  (STANDING):  piperacillin/tazobactam IVPB.   IV Intermittent   piperacillin/tazobactam IVPB. 3.375 Gram(s) IV Intermittent every 6 hours  lactated ringers. 1000 milliLiter(s) (150 mL/Hr) IV Continuous <Continuous>  heparin  Injectable 5000 Unit(s) SubCutaneous every 8 hours  ketorolac   Injectable 30 milliGRAM(s) IV Push every 6 hours    MEDICATIONS  (PRN):  ondansetron Injectable 4 milliGRAM(s) IV Push every 6 hours PRN Nausea  naloxone Injectable 0.1 milliGRAM(s) IV Push every 3 minutes PRN For ANY of the following changes in patient status:  A. RR LESS THAN 10 breaths per minute, B. Oxygen saturation LESS THAN 90%, C. Sedation score of 6      Vital Signs Last 24 Hrs  T(C): 36.2 (09 Jul 2017 16:41), Max: 36.9 (09 Jul 2017 08:29)  T(F): 97.2 (09 Jul 2017 16:41), Max: 98.4 (09 Jul 2017 08:29)  HR: 69 (09 Jul 2017 16:41) (66 - 72)  BP: 103/68 (09 Jul 2017 16:41) (103/68 - 131/83)  BP(mean): --  RR: 15 (09 Jul 2017 16:41) (15 - 17)  SpO2: 96% (09 Jul 2017 16:41) (95% - 97%)    PHYSICAL EXAM:      Constitutional:    Eyes:    ENMT:    Neck:    Breasts:    Back:    Respiratory:    Cardiovascular:    Gastrointestinal:    Genitourinary:    Rectal:    Extremities:    Vascular:    Neurological:    Skin:    Lymph Nodes:    Musculoskeletal:    Psychiatric:        I&O's Detail    08 Jul 2017 07:01  -  09 Jul 2017 07:00  --------------------------------------------------------  IN:    lactated ringers.: 1650 mL  Total IN: 1650 mL    OUT:    Nasoenteral Tube: 500 mL    Stool: 1 mL    Voided: 400 mL  Total OUT: 901 mL    Total NET: 749 mL      09 Jul 2017 07:01  -  09 Jul 2017 17:56  --------------------------------------------------------  IN:    lactated ringers.: 450 mL    Oral Fluid: 220 mL    Solution: 100 mL    Solution: 100 mL  Total IN: 870 mL    OUT:    Voided: 1200 mL  Total OUT: 1200 mL    Total NET: -330 mL          LABS:                        13.5   10.3  )-----------( 190      ( 09 Jul 2017 08:03 )             39.0     07-09    136  |  98  |  11  ----------------------------<  105<H>  3.9   |  25  |  0.80    Ca    8.5      09 Jul 2017 08:02  Phos  2.2     07-09  Mg     1.8     07-09            RADIOLOGY & ADDITIONAL STUDIES:

## 2017-07-09 NOTE — PROGRESS NOTE ADULT - ASSESSMENT
47 yo F s/p laparoscopic sigmoidectomy     NPO with sips  IVF  SQH  Pain/nausea control  F/U AM labs   IS  OOBA

## 2017-07-09 NOTE — PROGRESS NOTE ADULT - ASSESSMENT
ABD SOFT, BS+, FLATUS+, BM+, WOUNDS DRY CLEAN INTACT.  STARTED ON CLEAR LIQUID DIET, TOLERATING IT, IV ABX, DVT PROFILAXIS, SPIROMETRY, OOB.  POSSEBLE D/C HOME TOMORROW

## 2017-07-09 NOTE — PROGRESS NOTE ADULT - SUBJECTIVE AND OBJECTIVE BOX
STATUS POST:  laparoscopic sigmoidectomy     POST OPERATIVE DAY #: 3      +flatus/+BM, pain controlled with pain medication, OOBA, denies nausea, vomiting.    piperacillin/tazobactam IVPB.   IV Intermittent   piperacillin/tazobactam IVPB. 3.375 Gram(s) IV Intermittent every 6 hours      Vital Signs Last 24 Hrs  T(C): 36.8 (09 Jul 2017 05:21), Max: 37.2 (08 Jul 2017 09:24)  T(F): 98.3 (09 Jul 2017 05:21), Max: 99 (08 Jul 2017 09:24)  HR: 72 (09 Jul 2017 05:21) (67 - 77)  BP: 108/65 (09 Jul 2017 05:21) (108/65 - 122/73)  BP(mean): --  RR: 17 (09 Jul 2017 05:21) (16 - 18)  SpO2: 95% (09 Jul 2017 05:21) (95% - 98%)    I&O's Detail    08 Jul 2017 07:01  -  09 Jul 2017 07:00  --------------------------------------------------------  IN:    lactated ringers.: 1650 mL  Total IN: 1650 mL    OUT:    Nasoenteral Tube: 500 mL    Stool: 1 mL    Voided: 400 mL  Total OUT: 901 mL    Total NET: 749 mL        PHYSICAL EXAM:      Constitutional: NAD    Gastrointestinal: and softly distended, flip tneder    Genitourinary: adequate UOP

## 2017-07-10 VITALS
HEART RATE: 66 BPM | SYSTOLIC BLOOD PRESSURE: 106 MMHG | RESPIRATION RATE: 18 BRPM | OXYGEN SATURATION: 97 % | DIASTOLIC BLOOD PRESSURE: 73 MMHG | TEMPERATURE: 98 F

## 2017-07-10 LAB
ANION GAP SERPL CALC-SCNC: 11 MMOL/L — SIGNIFICANT CHANGE UP (ref 5–17)
BUN SERPL-MCNC: 6 MG/DL — LOW (ref 7–23)
CALCIUM SERPL-MCNC: 8.5 MG/DL — SIGNIFICANT CHANGE UP (ref 8.4–10.5)
CHLORIDE SERPL-SCNC: 101 MMOL/L — SIGNIFICANT CHANGE UP (ref 96–108)
CO2 SERPL-SCNC: 26 MMOL/L — SIGNIFICANT CHANGE UP (ref 22–31)
CREAT SERPL-MCNC: 0.7 MG/DL — SIGNIFICANT CHANGE UP (ref 0.5–1.3)
GLUCOSE SERPL-MCNC: 89 MG/DL — SIGNIFICANT CHANGE UP (ref 70–99)
HCT VFR BLD CALC: 37 % — SIGNIFICANT CHANGE UP (ref 34.5–45)
HGB BLD-MCNC: 12.4 G/DL — SIGNIFICANT CHANGE UP (ref 11.5–15.5)
MAGNESIUM SERPL-MCNC: 2 MG/DL — SIGNIFICANT CHANGE UP (ref 1.6–2.6)
MCHC RBC-ENTMCNC: 30.2 PG — SIGNIFICANT CHANGE UP (ref 27–34)
MCHC RBC-ENTMCNC: 33.5 G/DL — SIGNIFICANT CHANGE UP (ref 32–36)
MCV RBC AUTO: 90 FL — SIGNIFICANT CHANGE UP (ref 80–100)
PHOSPHATE SERPL-MCNC: 3.4 MG/DL — SIGNIFICANT CHANGE UP (ref 2.5–4.5)
PLATELET # BLD AUTO: 197 K/UL — SIGNIFICANT CHANGE UP (ref 150–400)
POTASSIUM SERPL-MCNC: 3.8 MMOL/L — SIGNIFICANT CHANGE UP (ref 3.5–5.3)
POTASSIUM SERPL-SCNC: 3.8 MMOL/L — SIGNIFICANT CHANGE UP (ref 3.5–5.3)
RBC # BLD: 4.11 M/UL — SIGNIFICANT CHANGE UP (ref 3.8–5.2)
RBC # FLD: 12.8 % — SIGNIFICANT CHANGE UP (ref 10.3–16.9)
SODIUM SERPL-SCNC: 138 MMOL/L — SIGNIFICANT CHANGE UP (ref 135–145)
WBC # BLD: 6.6 K/UL — SIGNIFICANT CHANGE UP (ref 3.8–10.5)
WBC # FLD AUTO: 6.6 K/UL — SIGNIFICANT CHANGE UP (ref 3.8–10.5)

## 2017-07-10 PROCEDURE — 88300 SURGICAL PATH GROSS: CPT

## 2017-07-10 PROCEDURE — 36415 COLL VENOUS BLD VENIPUNCTURE: CPT

## 2017-07-10 PROCEDURE — 80048 BASIC METABOLIC PNL TOTAL CA: CPT

## 2017-07-10 PROCEDURE — 88307 TISSUE EXAM BY PATHOLOGIST: CPT

## 2017-07-10 PROCEDURE — 74019 RADEX ABDOMEN 2 VIEWS: CPT

## 2017-07-10 PROCEDURE — 86900 BLOOD TYPING SEROLOGIC ABO: CPT

## 2017-07-10 PROCEDURE — 71045 X-RAY EXAM CHEST 1 VIEW: CPT

## 2017-07-10 PROCEDURE — 83735 ASSAY OF MAGNESIUM: CPT

## 2017-07-10 PROCEDURE — 80053 COMPREHEN METABOLIC PANEL: CPT

## 2017-07-10 PROCEDURE — 85027 COMPLETE CBC AUTOMATED: CPT

## 2017-07-10 PROCEDURE — 84100 ASSAY OF PHOSPHORUS: CPT

## 2017-07-10 PROCEDURE — 88304 TISSUE EXAM BY PATHOLOGIST: CPT

## 2017-07-10 PROCEDURE — 86850 RBC ANTIBODY SCREEN: CPT

## 2017-07-10 PROCEDURE — 86901 BLOOD TYPING SEROLOGIC RH(D): CPT

## 2017-07-10 RX ORDER — SODIUM CHLORIDE 9 MG/ML
3 INJECTION INTRAMUSCULAR; INTRAVENOUS; SUBCUTANEOUS EVERY 8 HOURS
Qty: 0 | Refills: 0 | Status: DISCONTINUED | OUTPATIENT
Start: 2017-07-10 | End: 2017-07-10

## 2017-07-10 RX ADMIN — HEPARIN SODIUM 5000 UNIT(S): 5000 INJECTION INTRAVENOUS; SUBCUTANEOUS at 06:01

## 2017-07-10 RX ADMIN — Medication 30 MILLIGRAM(S): at 12:04

## 2017-07-10 RX ADMIN — Medication 30 MILLIGRAM(S): at 06:01

## 2017-07-10 RX ADMIN — SODIUM CHLORIDE 3 MILLILITER(S): 9 INJECTION INTRAMUSCULAR; INTRAVENOUS; SUBCUTANEOUS at 14:03

## 2017-07-10 RX ADMIN — Medication 30 MILLIGRAM(S): at 12:40

## 2017-07-10 RX ADMIN — PIPERACILLIN AND TAZOBACTAM 200 GRAM(S): 4; .5 INJECTION, POWDER, LYOPHILIZED, FOR SOLUTION INTRAVENOUS at 12:04

## 2017-07-10 RX ADMIN — HEPARIN SODIUM 5000 UNIT(S): 5000 INJECTION INTRAVENOUS; SUBCUTANEOUS at 14:01

## 2017-07-10 RX ADMIN — PIPERACILLIN AND TAZOBACTAM 200 GRAM(S): 4; .5 INJECTION, POWDER, LYOPHILIZED, FOR SOLUTION INTRAVENOUS at 01:24

## 2017-07-10 RX ADMIN — PIPERACILLIN AND TAZOBACTAM 200 GRAM(S): 4; .5 INJECTION, POWDER, LYOPHILIZED, FOR SOLUTION INTRAVENOUS at 06:01

## 2017-07-10 NOTE — DISCHARGE NOTE ADULT - PLAN OF CARE
Recovery Continue a regular diet as tolerated.  You may shower, let water and soap run over your incisions, pat dry, do not scrub.  NO heavy lifting of more than 15 lbs, no strenuous activity.  Use sunblock do not expose your incisions to the sun as this will darken the scars.  Please take the antibiotic course as prescribed.  Use tylenol for pain control, if this is not sufficient you may take the pain medicine prescribed.  Please call your physician or go to the ER if you experience fever, chest pain, severe abdominal pain uncontrolled by the pain medication, severe nausea, drainage from your incision sites.  Follow up with Dr. Charles in 1 to 2 weeks, please call the office to make an appointment. Continue a regular diet as tolerated.  You may shower, let water and soap run over your incisions, pat dry, do not scrub.  NO heavy lifting of more than 15 lbs, no strenuous activity.  Use sunblock or do not expose your incisions to the sun as this will darken the scars.  Please take the antibiotic course as prescribed.  Use tylenol for pain control, if this is not sufficient you may take the pain medicine prescribed.  Please call your physician or go to the ER if you experience fever, chest pain, severe abdominal pain uncontrolled by the pain medication, severe nausea, drainage from your incision sites.  Follow up with Dr. Charles in 1 to 2 weeks, please call the office to make an appointment. Continue a regular diet as tolerated.  You may shower, let water and soap run over your incisions, pat dry, do not scrub.  NO heavy lifting of more than 15 lbs, no strenuous activity.  Use sunblock or do not expose your incisions to the sun as this will darken the scars.  Please take the antibiotic course as prescribed.  Use tylenol and advil every 6 hours as well as ice packs for pain control, if this is not sufficient you may take the pain medicine prescribed.  Please call your physician or go to the ER if you experience fever, chest pain, severe abdominal pain uncontrolled by the pain medication, severe nausea, drainage from your incision sites.  Follow up with Dr. Charles in 1 to 2 weeks, please call the office to make an appointment.

## 2017-07-10 NOTE — DISCHARGE NOTE ADULT - PATIENT PORTAL LINK FT
“You can access the [facility] Patient Portal, offered by [facility], by registering with the following website: [email or web address]”

## 2017-07-10 NOTE — DISCHARGE NOTE ADULT - MEDICATION SUMMARY - MEDICATIONS TO TAKE
I will START or STAY ON the medications listed below when I get home from the hospital:    Percocet 5/325 325 mg-5 mg oral tablet  -- 1 tab(s) by mouth every 6 hours, As Needed -for severe pain MDD:4  -- Caution federal law prohibits the transfer of this drug to any person other  than the person for whom it was prescribed.  May cause drowsiness.  Alcohol may intensify this effect.  Use care when operating dangerous machinery.  This prescription cannot be refilled.  This product contains acetaminophen.  Do not use  with any other product containing acetaminophen to prevent possible liver damage.  Using more of this medication than prescribed may cause serious breathing problems.    -- Indication: For Post surgical pain    Augmentin 875 mg-125 mg oral tablet  -- 1 tab(s) by mouth every 12 hours  -- Finish all this medication unless otherwise directed by prescriber.  Take with food or milk.    -- Indication: For Post Operative antibiotics I will START or STAY ON the medications listed below when I get home from the hospital:    Vicodin 300 mg-5 mg oral tablet  -- 1 tab(s) by mouth every 6 hours, As Needed -for severe pain MDD:4  -- Caution federal law prohibits the transfer of this drug to any person other  than the person for whom it was prescribed.  Do not drink alcoholic beverages when taking this medication.  May cause drowsiness.  Alcohol may intensify this effect.  Use care when operating dangerous machinery.  This drug may impair the ability to drive or operate machinery.  Use care until you become familiar with its effects.  This product contains acetaminophen.  Do not use  with any other product containing acetaminophen to prevent possible liver damage.  Using more of this medication than prescribed may cause serious breathing problems.    -- Indication: For post surgical pain    Augmentin 875 mg-125 mg oral tablet  -- 1 tab(s) by mouth every 12 hours  -- Finish all this medication unless otherwise directed by prescriber.  Take with food or milk.    -- Indication: For Post Operative antibiotics

## 2017-07-10 NOTE — DISCHARGE NOTE ADULT - CARE PLAN
Principal Discharge DX:	Status post laparoscopic-assisted sigmoidectomy  Goal:	Recovery  Instructions for follow-up, activity and diet:	Continue a regular diet as tolerated.  You may shower, let water and soap run over your incisions, pat dry, do not scrub.  NO heavy lifting of more than 15 lbs, no strenuous activity.  Use sunblock do not expose your incisions to the sun as this will darken the scars.  Please take the antibiotic course as prescribed.  Use tylenol for pain control, if this is not sufficient you may take the pain medicine prescribed.  Please call your physician or go to the ER if you experience fever, chest pain, severe abdominal pain uncontrolled by the pain medication, severe nausea, drainage from your incision sites.  Follow up with Dr. Charles in 1 to 2 weeks, please call the office to make an appointment. Principal Discharge DX:	Status post laparoscopic-assisted sigmoidectomy  Goal:	Recovery  Instructions for follow-up, activity and diet:	Continue a regular diet as tolerated.  You may shower, let water and soap run over your incisions, pat dry, do not scrub.  NO heavy lifting of more than 15 lbs, no strenuous activity.  Use sunblock or do not expose your incisions to the sun as this will darken the scars.  Please take the antibiotic course as prescribed.  Use tylenol for pain control, if this is not sufficient you may take the pain medicine prescribed.  Please call your physician or go to the ER if you experience fever, chest pain, severe abdominal pain uncontrolled by the pain medication, severe nausea, drainage from your incision sites.  Follow up with Dr. Charles in 1 to 2 weeks, please call the office to make an appointment. Principal Discharge DX:	Status post laparoscopic-assisted sigmoidectomy  Goal:	Recovery  Instructions for follow-up, activity and diet:	Continue a regular diet as tolerated.  You may shower, let water and soap run over your incisions, pat dry, do not scrub.  NO heavy lifting of more than 15 lbs, no strenuous activity.  Use sunblock or do not expose your incisions to the sun as this will darken the scars.  Please take the antibiotic course as prescribed.  Use tylenol and advil every 6 hours as well as ice packs for pain control, if this is not sufficient you may take the pain medicine prescribed.  Please call your physician or go to the ER if you experience fever, chest pain, severe abdominal pain uncontrolled by the pain medication, severe nausea, drainage from your incision sites.  Follow up with Dr. Charles in 1 to 2 weeks, please call the office to make an appointment.

## 2017-07-10 NOTE — PROGRESS NOTE ADULT - SUBJECTIVE AND OBJECTIVE BOX
O/N:  VSS, ISAAC, adv to FLD and tejinder, adequate UO, dec IVF to 75cc/hr  7/9: +flatus, +BM, advanced to CLD, tolerating. VSS    status post: Lap sig and SBR    POD # 4 O/N:  VSS, ISAAC, adv to FLD and tejinder, adequate UO, dec IVF to 75cc/hr  7/9: +flatus, +BM, advanced to CLD, tolerating. VSS    status post: Lap sig and SBR    POD # 4     SUBJECTIVE: Patient seen and examined bedside by chief resident. No c/o pain. No N/V/D. No longer requring NG tube    piperacillin/tazobactam IVPB.   IV Intermittent   piperacillin/tazobactam IVPB. 3.375 Gram(s) IV Intermittent every 6 hours  heparin  Injectable 5000 Unit(s) SubCutaneous every 8 hours      Vital Signs Last 24 Hrs  T(C): 36.4 (10 Jul 2017 05:27), Max: 36.9 (09 Jul 2017 08:29)  T(F): 97.6 (10 Jul 2017 05:27), Max: 98.4 (09 Jul 2017 08:29)  HR: 67 (10 Jul 2017 05:27) (63 - 71)  BP: 108/73 (10 Jul 2017 05:27) (103/68 - 131/83)  BP(mean): --  RR: 16 (10 Jul 2017 05:27) (15 - 16)  SpO2: 99% (10 Jul 2017 05:27) (96% - 99%)  I&O's Detail    09 Jul 2017 07:01  -  10 Jul 2017 07:00  --------------------------------------------------------  IN:    lactated ringers.: 2175 mL    Oral Fluid: 940 mL    Solution: 100 mL    Solution: 200 mL  Total IN: 3415 mL    OUT:    Voided: 2100 mL  Total OUT: 2100 mL    Total NET: 1315 mL          General: NAD, resting comfortably in bed  C/V: NSR  Pulm: Nonlabored breathing, no respiratory distress  Abd: soft, NT/ND. Incisions C/D/I  Extrem: WWP, no edema, SCDs in place        LABS:                        13.5   10.3  )-----------( 190      ( 09 Jul 2017 08:03 )             39.0     07-09    136  |  98  |  11  ----------------------------<  105<H>  3.9   |  25  |  0.80    Ca    8.5      09 Jul 2017 08:02  Phos  2.2     07-09  Mg     1.8     07-09

## 2017-07-10 NOTE — DISCHARGE NOTE ADULT - CONDITIONS AT DISCHARGE
Patient stable. VSS. No distress noted. Denies sob/cp. Tolerated diet well. Voiding adequately and bowel function is wdl.

## 2017-07-10 NOTE — PROGRESS NOTE ADULT - ASSESSMENT
ABD SOFT, BS+, FLATUS+, BM+, WOUNDS DRY CLEAN INTACT.  STARTED SOFT MECHANICAL DIET, TOLERATING IT, IV ABX, DVT PROFILAXIS, SPIROMETRY, OOB.  POSSEBLE D/C HOME

## 2017-07-10 NOTE — PROGRESS NOTE ADULT - SUBJECTIVE AND OBJECTIVE BOX
INTERVAL HPI/OVERNIGHT EVENTS:   SURGERY ATTENDING    STATUS POST:  LAPAROSCOPIC MOBILISATION OF SPLENIC FLEXURE, SIGMOIDECTOMY WITH LOW COLORECTAL ANASTOMOSIS, RIGID PROCTOCOLONOSCOPY, SMALL BOWEL RESECTION WITH ANASTOMOSIS.      POST OPERATIVE DAY #: 4    SUBJECTIVE:  Flatus: [X ] YES [ ] NO             Bowel Movement: [X ] YES [ ] NO  Pain (0-10):        1    Pain Control Adequate: [X ] YES [ ] NO  Nausea: [ ] YES [X ] NO            Vomiting: [ ] YES [X ] NO  Diarrhea: [ ] YES [X ] NO         Constipation: [ ] YES [X ] NO     Chest Pain: [ ] YES [X ] NO    SOB:  [ ] YES [X ] NO    MEDICATIONS  (STANDING):  piperacillin/tazobactam IVPB.   IV Intermittent   piperacillin/tazobactam IVPB. 3.375 Gram(s) IV Intermittent every 6 hours  heparin  Injectable 5000 Unit(s) SubCutaneous every 8 hours  sodium chloride 0.9% lock flush 3 milliLiter(s) IV Push every 8 hours    MEDICATIONS  (PRN):  ondansetron Injectable 4 milliGRAM(s) IV Push every 6 hours PRN Nausea  naloxone Injectable 0.1 milliGRAM(s) IV Push every 3 minutes PRN For ANY of the following changes in patient status:  A. RR LESS THAN 10 breaths per minute, B. Oxygen saturation LESS THAN 90%, C. Sedation score of 6      Vital Signs Last 24 Hrs  T(C): 36.8 (10 Jul 2017 16:41), Max: 37.3 (10 Jul 2017 08:08)  T(F): 98.3 (10 Jul 2017 16:41), Max: 99.1 (10 Jul 2017 08:08)  HR: 66 (10 Jul 2017 16:41) (63 - 69)  BP: 106/73 (10 Jul 2017 16:41) (101/- - 108/73)  BP(mean): --  RR: 18 (10 Jul 2017 16:41) (16 - 18)  SpO2: 97% (10 Jul 2017 16:41) (96% - 99%)    PHYSICAL EXAM:      Constitutional:    Eyes:    ENMT:    Neck:    Breasts:    Back:    Respiratory:    Cardiovascular:    Gastrointestinal:    Genitourinary:    Rectal:    Extremities:    Vascular:    Neurological:    Skin:    Lymph Nodes:    Musculoskeletal:    Psychiatric:        I&O's Detail    09 Jul 2017 07:01  -  10 Jul 2017 07:00  --------------------------------------------------------  IN:    lactated ringers.: 2175 mL    Oral Fluid: 940 mL    Solution: 100 mL    Solution: 200 mL  Total IN: 3415 mL    OUT:    Voided: 2100 mL  Total OUT: 2100 mL    Total NET: 1315 mL      10 Jul 2017 07:01  -  10 Jul 2017 17:22  --------------------------------------------------------  IN:    Oral Fluid: 400 mL  Total IN: 400 mL    OUT:    Stool: 1 mL    Voided: 700 mL  Total OUT: 701 mL    Total NET: -301 mL          LABS:                        12.4   6.6   )-----------( 197      ( 10 Jul 2017 06:29 )             37.0     07-10    138  |  101  |  6<L>  ----------------------------<  89  3.8   |  26  |  0.70    Ca    8.5      10 Jul 2017 06:31  Phos  3.4     07-10  Mg     2.0     07-10            RADIOLOGY & ADDITIONAL STUDIES:

## 2017-07-10 NOTE — PROGRESS NOTE ADULT - ASSESSMENT
45 y/o F s/p Lap sig and SBR    FLD  IVF  Toradol standing  nausea control  Zosyn  AM labs  HSQ  OOBA/IS 47 y/o F s/p Lap sig and SBR    Mechanical Soft  IVF  Toradol standing  nausea control  Zosyn  Possible d/c today  AM labs  HSQ  OOBA/IS

## 2017-07-10 NOTE — DISCHARGE NOTE ADULT - HOSPITAL COURSE
45 yo F with recurrent episodes of diverticulitis presented for elective laparoscopic sigmoidectomy on 07/06/2017. Procedure was uncomplicated and tolerated well by the patient. Post operatively patient developed an ileus, NGT was placed, return of bowel function noted after adequate decompression, NGT DC'ed, diet advanced as tolerated. At time of discharge patient is tolerating a mechanical soft diet, ambulating unassisted, VSS.

## 2017-07-12 DIAGNOSIS — K91.3 POSTPROCEDURAL INTESTINAL OBSTRUCTION: ICD-10-CM

## 2017-07-12 DIAGNOSIS — Z90.49 ACQUIRED ABSENCE OF OTHER SPECIFIED PARTS OF DIGESTIVE TRACT: ICD-10-CM

## 2017-07-12 DIAGNOSIS — F17.210 NICOTINE DEPENDENCE, CIGARETTES, UNCOMPLICATED: ICD-10-CM

## 2017-07-12 DIAGNOSIS — K56.49 OTHER IMPACTION OF INTESTINE: ICD-10-CM

## 2017-07-12 DIAGNOSIS — E28.2 POLYCYSTIC OVARIAN SYNDROME: ICD-10-CM

## 2017-07-12 DIAGNOSIS — K57.30 DIVERTICULOSIS OF LARGE INTESTINE WITHOUT PERFORATION OR ABSCESS WITHOUT BLEEDING: ICD-10-CM

## 2017-07-12 DIAGNOSIS — K57.32 DIVERTICULITIS OF LARGE INTESTINE WITHOUT PERFORATION OR ABSCESS WITHOUT BLEEDING: ICD-10-CM

## 2017-07-12 DIAGNOSIS — Q43.0 MECKEL'S DIVERTICULUM (DISPLACED) (HYPERTROPHIC): ICD-10-CM

## 2017-07-12 DIAGNOSIS — Z98.51 TUBAL LIGATION STATUS: ICD-10-CM
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
## 2017-07-12 DIAGNOSIS — R11.14 BILIOUS VOMITING: ICD-10-CM

## 2017-07-12 LAB — SURGICAL PATHOLOGY STUDY: SIGNIFICANT CHANGE UP

## 2017-07-13 DIAGNOSIS — T18.4XXA FOREIGN BODY IN COLON, INITIAL ENCOUNTER: ICD-10-CM

## 2017-07-13 DIAGNOSIS — N83.201 UNSPECIFIED OVARIAN CYST, RIGHT SIDE: ICD-10-CM
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
## 2018-03-09 NOTE — PATIENT PROFILE ADULT. - NSTOBACCONEVERSMOKERY/N_GEN_A
Chest Pain, Pediatric  Chest pain is an uncomfortable, tight, or painful feeling in the chest. Chest pain may go away on its own and is usually not dangerous.  What are the causes?  Common causes of chest pain include:  · Receiving a direct blow to the chest.  · A pulled muscle (strain).  · Muscle cramping.  · A pinched nerve.  · A lung infection (pneumonia).  · Asthma.  · Coughing.  · Stress.  · Acid reflux.  Follow these instructions at home:  · Have your child avoid physical activity if it causes pain.  · Have you child avoid lifting heavy objects.  · If directed by your child's caregiver, put ice on the injured area.  ¨ Put ice in a plastic bag.  ¨ Place a towel between your child's skin and the bag.  ¨ Leave the ice on for 15-20 minutes, 3-4 times a day.  · Only give your child over-the-counter or prescription medicines as directed by his or her caregiver.  · Give your child antibiotic medicine as directed. Make sure your child finishes it even if he or she starts to feel better.  Get help right away if:  · Your child’s chest pain becomes severe and radiates into the neck, arms, or jaw.  · Your child has difficulty breathing.  · Your child's heart starts to beat fast while he or she is at rest.  · Your child who is younger than 3 months has a fever.  · Your child who is older than 3 months has a fever and persistent symptoms.  · Your child who is older than 3 months has a fever and symptoms suddenly get worse.  · Your child faints.  · Your child coughs up blood.  · Your child coughs up phlegm that appears pus-like (sputum).  · Your child’s chest pain worsens.  This information is not intended to replace advice given to you by your health care provider. Make sure you discuss any questions you have with your health care provider.  Document Released: 03/06/2008 Document Revised: 05/31/2017 Document Reviewed: 08/13/2013  [employer] Interactive Patient Education © 2017 [employer]     Yes, Non-Core measure site...

## 2019-03-29 PROBLEM — Z00.00 ENCOUNTER FOR PREVENTIVE HEALTH EXAMINATION: Status: ACTIVE | Noted: 2019-03-29

## 2019-04-01 ENCOUNTER — APPOINTMENT (OUTPATIENT)
Dept: HUMAN REPRODUCTION | Facility: CLINIC | Age: 49
End: 2019-04-01
Payer: COMMERCIAL

## 2019-04-01 PROCEDURE — 99205 OFFICE O/P NEW HI 60 MIN: CPT | Mod: 25

## 2019-04-01 PROCEDURE — 76830 TRANSVAGINAL US NON-OB: CPT

## 2019-04-01 PROCEDURE — 36415 COLL VENOUS BLD VENIPUNCTURE: CPT

## 2019-04-04 ENCOUNTER — RESULT REVIEW (OUTPATIENT)
Age: 49
End: 2019-04-04

## 2019-04-17 ENCOUNTER — APPOINTMENT (OUTPATIENT)
Dept: HUMAN REPRODUCTION | Facility: CLINIC | Age: 49
End: 2019-04-17

## 2019-04-22 ENCOUNTER — APPOINTMENT (OUTPATIENT)
Dept: HUMAN REPRODUCTION | Facility: CLINIC | Age: 49
End: 2019-04-22

## 2019-06-30 ENCOUNTER — EMERGENCY (EMERGENCY)
Facility: HOSPITAL | Age: 49
LOS: 1 days | Discharge: HOME | End: 2019-06-30
Attending: EMERGENCY MEDICINE | Admitting: EMERGENCY MEDICINE
Payer: COMMERCIAL

## 2019-06-30 VITALS
SYSTOLIC BLOOD PRESSURE: 130 MMHG | DIASTOLIC BLOOD PRESSURE: 84 MMHG | TEMPERATURE: 98 F | OXYGEN SATURATION: 97 % | RESPIRATION RATE: 18 BRPM | HEIGHT: 65 IN | HEART RATE: 85 BPM | WEIGHT: 152.12 LBS

## 2019-06-30 VITALS
HEART RATE: 63 BPM | DIASTOLIC BLOOD PRESSURE: 60 MMHG | OXYGEN SATURATION: 99 % | SYSTOLIC BLOOD PRESSURE: 97 MMHG | RESPIRATION RATE: 16 BRPM | TEMPERATURE: 98 F

## 2019-06-30 DIAGNOSIS — Z98.51 TUBAL LIGATION STATUS: Chronic | ICD-10-CM

## 2019-06-30 DIAGNOSIS — Z90.49 ACQUIRED ABSENCE OF OTHER SPECIFIED PARTS OF DIGESTIVE TRACT: Chronic | ICD-10-CM

## 2019-06-30 DIAGNOSIS — N83.209 UNSPECIFIED OVARIAN CYST, UNSPECIFIED SIDE: ICD-10-CM

## 2019-06-30 DIAGNOSIS — Z98.890 OTHER SPECIFIED POSTPROCEDURAL STATES: Chronic | ICD-10-CM

## 2019-06-30 DIAGNOSIS — R10.9 UNSPECIFIED ABDOMINAL PAIN: ICD-10-CM

## 2019-06-30 LAB
ALBUMIN SERPL ELPH-MCNC: 4.3 G/DL — SIGNIFICANT CHANGE UP (ref 3.3–5)
ALP SERPL-CCNC: 60 U/L — SIGNIFICANT CHANGE UP (ref 40–120)
ALT FLD-CCNC: 16 U/L — SIGNIFICANT CHANGE UP (ref 10–45)
ANION GAP SERPL CALC-SCNC: 10 MMOL/L — SIGNIFICANT CHANGE UP (ref 5–17)
APPEARANCE UR: CLEAR — SIGNIFICANT CHANGE UP
APTT BLD: 29.6 SEC — SIGNIFICANT CHANGE UP (ref 27.5–36.3)
AST SERPL-CCNC: 19 U/L — SIGNIFICANT CHANGE UP (ref 10–40)
BASOPHILS # BLD AUTO: 0.06 K/UL — SIGNIFICANT CHANGE UP (ref 0–0.2)
BASOPHILS NFR BLD AUTO: 0.6 % — SIGNIFICANT CHANGE UP (ref 0–2)
BILIRUB SERPL-MCNC: 0.4 MG/DL — SIGNIFICANT CHANGE UP (ref 0.2–1.2)
BILIRUB UR-MCNC: NEGATIVE — SIGNIFICANT CHANGE UP
BUN SERPL-MCNC: 9 MG/DL — SIGNIFICANT CHANGE UP (ref 7–23)
CALCIUM SERPL-MCNC: 9.5 MG/DL — SIGNIFICANT CHANGE UP (ref 8.4–10.5)
CHLORIDE SERPL-SCNC: 106 MMOL/L — SIGNIFICANT CHANGE UP (ref 96–108)
CO2 SERPL-SCNC: 24 MMOL/L — SIGNIFICANT CHANGE UP (ref 22–31)
COLOR SPEC: YELLOW — SIGNIFICANT CHANGE UP
CREAT SERPL-MCNC: 0.7 MG/DL — SIGNIFICANT CHANGE UP (ref 0.5–1.3)
DIFF PNL FLD: ABNORMAL
EOSINOPHIL # BLD AUTO: 0.21 K/UL — SIGNIFICANT CHANGE UP (ref 0–0.5)
EOSINOPHIL NFR BLD AUTO: 2.2 % — SIGNIFICANT CHANGE UP (ref 0–6)
EXTRA SST TUBE: SIGNIFICANT CHANGE UP
GLUCOSE SERPL-MCNC: 117 MG/DL — HIGH (ref 70–99)
GLUCOSE UR QL: NEGATIVE — SIGNIFICANT CHANGE UP
HCT VFR BLD CALC: 44.1 % — SIGNIFICANT CHANGE UP (ref 34.5–45)
HGB BLD-MCNC: 15 G/DL — SIGNIFICANT CHANGE UP (ref 11.5–15.5)
IMM GRANULOCYTES NFR BLD AUTO: 0.4 % — SIGNIFICANT CHANGE UP (ref 0–1.5)
INR BLD: 0.98 — SIGNIFICANT CHANGE UP (ref 0.88–1.16)
KETONES UR-MCNC: NEGATIVE — SIGNIFICANT CHANGE UP
LEUKOCYTE ESTERASE UR-ACNC: ABNORMAL
LIDOCAIN IGE QN: 45 U/L — SIGNIFICANT CHANGE UP (ref 7–60)
LYMPHOCYTES # BLD AUTO: 3.31 K/UL — HIGH (ref 1–3.3)
LYMPHOCYTES # BLD AUTO: 34.1 % — SIGNIFICANT CHANGE UP (ref 13–44)
MCHC RBC-ENTMCNC: 31.4 PG — SIGNIFICANT CHANGE UP (ref 27–34)
MCHC RBC-ENTMCNC: 34 GM/DL — SIGNIFICANT CHANGE UP (ref 32–36)
MCV RBC AUTO: 92.5 FL — SIGNIFICANT CHANGE UP (ref 80–100)
MONOCYTES # BLD AUTO: 0.81 K/UL — SIGNIFICANT CHANGE UP (ref 0–0.9)
MONOCYTES NFR BLD AUTO: 8.4 % — SIGNIFICANT CHANGE UP (ref 2–14)
NEUTROPHILS # BLD AUTO: 5.27 K/UL — SIGNIFICANT CHANGE UP (ref 1.8–7.4)
NEUTROPHILS NFR BLD AUTO: 54.3 % — SIGNIFICANT CHANGE UP (ref 43–77)
NITRITE UR-MCNC: NEGATIVE — SIGNIFICANT CHANGE UP
NRBC # BLD: 0 /100 WBCS — SIGNIFICANT CHANGE UP (ref 0–0)
PH UR: 6 — SIGNIFICANT CHANGE UP (ref 5–8)
PLATELET # BLD AUTO: 219 K/UL — SIGNIFICANT CHANGE UP (ref 150–400)
POTASSIUM SERPL-MCNC: 4.2 MMOL/L — SIGNIFICANT CHANGE UP (ref 3.5–5.3)
POTASSIUM SERPL-SCNC: 4.2 MMOL/L — SIGNIFICANT CHANGE UP (ref 3.5–5.3)
PROT SERPL-MCNC: 7.1 G/DL — SIGNIFICANT CHANGE UP (ref 6–8.3)
PROT UR-MCNC: NEGATIVE MG/DL — SIGNIFICANT CHANGE UP
PROTHROM AB SERPL-ACNC: 11.1 SEC — SIGNIFICANT CHANGE UP (ref 10–12.9)
RBC # BLD: 4.77 M/UL — SIGNIFICANT CHANGE UP (ref 3.8–5.2)
RBC # FLD: 12.3 % — SIGNIFICANT CHANGE UP (ref 10.3–14.5)
SODIUM SERPL-SCNC: 140 MMOL/L — SIGNIFICANT CHANGE UP (ref 135–145)
SP GR SPEC: 1.01 — SIGNIFICANT CHANGE UP (ref 1–1.03)
UROBILINOGEN FLD QL: 0.2 E.U./DL — SIGNIFICANT CHANGE UP
WBC # BLD: 9.7 K/UL — SIGNIFICANT CHANGE UP (ref 3.8–10.5)
WBC # FLD AUTO: 9.7 K/UL — SIGNIFICANT CHANGE UP (ref 3.8–10.5)

## 2019-06-30 PROCEDURE — 80053 COMPREHEN METABOLIC PANEL: CPT

## 2019-06-30 PROCEDURE — 99284 EMERGENCY DEPT VISIT MOD MDM: CPT | Mod: 25

## 2019-06-30 PROCEDURE — 99284 EMERGENCY DEPT VISIT MOD MDM: CPT

## 2019-06-30 PROCEDURE — 86900 BLOOD TYPING SEROLOGIC ABO: CPT

## 2019-06-30 PROCEDURE — 76830 TRANSVAGINAL US NON-OB: CPT

## 2019-06-30 PROCEDURE — 36415 COLL VENOUS BLD VENIPUNCTURE: CPT

## 2019-06-30 PROCEDURE — 76856 US EXAM PELVIC COMPLETE: CPT | Mod: 26

## 2019-06-30 PROCEDURE — 86850 RBC ANTIBODY SCREEN: CPT

## 2019-06-30 PROCEDURE — 85025 COMPLETE CBC W/AUTO DIFF WBC: CPT

## 2019-06-30 PROCEDURE — 74177 CT ABD & PELVIS W/CONTRAST: CPT

## 2019-06-30 PROCEDURE — 83690 ASSAY OF LIPASE: CPT

## 2019-06-30 PROCEDURE — 96374 THER/PROPH/DIAG INJ IV PUSH: CPT | Mod: XU

## 2019-06-30 PROCEDURE — 96375 TX/PRO/DX INJ NEW DRUG ADDON: CPT

## 2019-06-30 PROCEDURE — 85730 THROMBOPLASTIN TIME PARTIAL: CPT

## 2019-06-30 PROCEDURE — 76856 US EXAM PELVIC COMPLETE: CPT

## 2019-06-30 PROCEDURE — 74177 CT ABD & PELVIS W/CONTRAST: CPT | Mod: 26

## 2019-06-30 PROCEDURE — 86901 BLOOD TYPING SEROLOGIC RH(D): CPT

## 2019-06-30 PROCEDURE — 87086 URINE CULTURE/COLONY COUNT: CPT

## 2019-06-30 PROCEDURE — 87186 SC STD MICRODIL/AGAR DIL: CPT

## 2019-06-30 PROCEDURE — 85610 PROTHROMBIN TIME: CPT

## 2019-06-30 PROCEDURE — 81001 URINALYSIS AUTO W/SCOPE: CPT

## 2019-06-30 PROCEDURE — 76830 TRANSVAGINAL US NON-OB: CPT | Mod: 26

## 2019-06-30 RX ORDER — IOHEXOL 300 MG/ML
30 INJECTION, SOLUTION INTRAVENOUS ONCE
Refills: 0 | Status: COMPLETED | OUTPATIENT
Start: 2019-06-30 | End: 2019-06-30

## 2019-06-30 RX ORDER — IBUPROFEN 200 MG
1 TABLET ORAL
Qty: 30 | Refills: 0
Start: 2019-06-30 | End: 2019-07-09

## 2019-06-30 RX ORDER — KETOROLAC TROMETHAMINE 30 MG/ML
30 SYRINGE (ML) INJECTION ONCE
Refills: 0 | Status: DISCONTINUED | OUTPATIENT
Start: 2019-06-30 | End: 2019-06-30

## 2019-06-30 RX ORDER — ONDANSETRON 8 MG/1
4 TABLET, FILM COATED ORAL ONCE
Refills: 0 | Status: COMPLETED | OUTPATIENT
Start: 2019-06-30 | End: 2019-06-30

## 2019-06-30 RX ORDER — SODIUM CHLORIDE 9 MG/ML
1000 INJECTION INTRAMUSCULAR; INTRAVENOUS; SUBCUTANEOUS ONCE
Refills: 0 | Status: COMPLETED | OUTPATIENT
Start: 2019-06-30 | End: 2019-06-30

## 2019-06-30 RX ADMIN — Medication 30 MILLIGRAM(S): at 18:13

## 2019-06-30 RX ADMIN — IOHEXOL 30 MILLILITER(S): 300 INJECTION, SOLUTION INTRAVENOUS at 15:03

## 2019-06-30 RX ADMIN — ONDANSETRON 4 MILLIGRAM(S): 8 TABLET, FILM COATED ORAL at 15:02

## 2019-06-30 RX ADMIN — SODIUM CHLORIDE 1000 MILLILITER(S): 9 INJECTION INTRAMUSCULAR; INTRAVENOUS; SUBCUTANEOUS at 15:03

## 2019-06-30 NOTE — ED ADULT NURSE NOTE - OBJECTIVE STATEMENT
c/o sudden onset of RLQ pain that started yesterday accompanied with nausea. Pt states the pain radiates to right lower back. Denies fever, chills, diarrhea, dysuria, or any other complaints.

## 2019-06-30 NOTE — ED PROVIDER NOTE - CLINICAL SUMMARY MEDICAL DECISION MAKING FREE TEXT BOX
RLQ abd pain. appendicitis vs ovarian cyst, less likely kidney stone. pt declined pain meds initially. labs noted. uhcg negative. pt evaluated in ED by surgery. ct scan done and no appendicitis. + ovarian cyst. u/s done and 1.9 cm cyst with no torsion. toradol given for pain. motrin, warm compresses and f/u with GYN. return precautions d/w pt.

## 2019-06-30 NOTE — ED PROVIDER NOTE - ATTENDING CONTRIBUTION TO CARE
pt w hx of diverticulitis w rlq pain, no assoc n/v/f/c, chest pain, sob, Well appearing, nad, nc/at, lung cta, heart reg, abd soft, mild rlq tenderness no g/r peritoneal signs, ext no gross deformity, no gross neuro deficits, pending CT, labs, surgery eval.

## 2019-06-30 NOTE — CONSULT NOTE ADULT - ASSESSMENT
Ms. Walton is a 49 yo F presenting with abdominal pain secondary to R ovary corpus luteal cyst.    - Giving clinical picture and etiology of abdominal pain, patient does not warrant any acute surgical intervention.   - Would recommend outpatient GYN follow-up.  - Would recommend transvaginal ultrasound.  - Recommend pain control per ED vs over the counter medication.  - Recommend symptom relief with heat-packs.  - Plan discussed with attending and chief resident.

## 2019-06-30 NOTE — ED ADULT NURSE NOTE - CHPI ED NUR SYMPTOMS NEG
no hematuria/no diarrhea/no blood in stool/no chills/no burning urination/no dysuria/no vomiting/no fever

## 2019-06-30 NOTE — CONSULT NOTE ADULT - SUBJECTIVE AND OBJECTIVE BOX
GENERAL SURGERY CONSULT NOTE    HPI:    Ms. Walton is a 49 yo F with history of diverticulitis s/p elective laparoscopic sigmoidectomy presenting with abdominal pain. Patient reports that her RLQ abdominal pain started yesterday afternoon and has progressively increased in intensity. She reports that the severity of her pain awoke her from sleep at 3 AM last night. She denies having a similar pain in the past. She denies fevers, chills, or emesis, but reports nausea. Her last bowel movement was on Friday, she denies hematochezia or melena. She reports having a colonoscopy in 2017 which was negative for polyps or malignancy; she was asked to follow-up for a repeat in 5 years. She reports having an endoscopy 10 years ago which was positive for a gastric ulcer.     In the ED, patient is afebrile and hemodynamically stable. WBC is 9.7, without a neutrophilia. CT imaging shows a normal appendix and a right ovary corpus luteal cyst.    PMH/PSx: diverticulitis s/p elective laparoscopic sigmoidectomy and small bowel resection for Meckel’s Diverticulum, laparoscopic tubal ligation (), laparoscopic cholecystectomy (), R elbow orthopedic surgery ().  Allergies: NKDA  Medications: denies  FH: denies FH of malignancies or IBD  SH: current smoker of 5 years (5-6 cigarettes per day), reports social ETOH use, denies drug use      Vital Signs Last 24 Hrs  T(C): 36.7 (2019 15:02), Max: 36.7 (2019 15:02)  T(F): 98.1 (2019 15:02), Max: 98.1 (2019 15:02)  HR: 76 (2019 15:02) (76 - 85)  BP: 117/74 (2019 15:02) (117/74 - 130/84)  BP(mean): --  RR: 16 (2019 15:02) (16 - 18)  SpO2: 97% (2019 15:02) (97% - 97%)    PHYSICAL EXAM:  General: no acute distress  Cardiovascular: NSR  Pulmonary: nonlabored breathing, no respiratory distress  Abdomen: nondistended, soft, diffuse tenderness, worse in RLQ, no guarding or rebound  Extremities: nonedematous    LABS:                        15.0   9.70  )-----------( 219      ( 2019 14:44 )             44.1         140  |  106  |  9   ----------------------------<  117<H>  4.2   |  24  |  0.70    Ca    9.5      2019 14:44    TPro  7.1  /  Alb  4.3  /  TBili  0.4  /  DBili  x   /  AST  19  /  ALT  16  /  AlkPhos  60  30    PT/INR - ( 2019 14:44 )   PT: 11.1 sec;   INR: 0.98          PTT - ( 2019 14:44 )  PTT:29.6 sec  Urinalysis Basic - ( 2019 14:57 )    Color: Yellow / Appearance: Clear / S.010 / pH: x  Gluc: x / Ketone: NEGATIVE  / Bili: Negative / Urobili: 0.2 E.U./dL   Blood: x / Protein: NEGATIVE mg/dL / Nitrite: NEGATIVE   Leuk Esterase: Small / RBC: < 5 /HPF / WBC 5-10 /HPF   Sq Epi: x / Non Sq Epi: Moderate /HPF / Bacteria: None /HPF

## 2019-06-30 NOTE — ED PROVIDER NOTE - NSFOLLOWUPINSTRUCTIONS_ED_ALL_ED_FT
Follow up with your GYN physician this week.       Ovarian Cyst    WHAT YOU NEED TO KNOW:    An ovarian cyst is a sac that grows on an ovary. This sac usually contains fluid, but may sometimes have blood or tissue in it. Most ovarian cysts are harmless and go away without treatment in a few months. Some cysts can grow large, cause pain, or break open.     DISCHARGE INSTRUCTIONS:    Call 911 for any of the following:     You are too weak or dizzy to stand up.        Return to the emergency department if:     You have severe abdominal pain. The pain may be sharp and sudden.      You have a fever.    Contact your healthcare provider if:     Your periods are early, late, or more painful than usual.      You have bleeding from your vagina that is not your period.      You have abdominal pain all the time.      Your abdomen is swollen.      You have feelings of fullness, pressure, or discomfort in your abdomen.      You have trouble urinating or emptying your bladder completely.      You have pain during sex.      You are losing weight without trying.      You have questions or concerns about your condition or care.    Medicines: You may need any of the following:     NSAIDs, such as ibuprofen, help decrease swelling, pain, and fever. This medicine is available with or without a doctor's order. NSAIDs can cause stomach bleeding or kidney problems in certain people. If you take blood thinner medicine, always ask if NSAIDs are safe for you. Always read the medicine label and follow directions. Do not give these medicines to children under 6 months of age without direction from your child's healthcare provider.      Birth control pills may help to control your periods, prevent cysts, or cause them to shrink.      Take your medicine as directed. Contact your healthcare provider if you think your medicine is not helping or if you have side effects. Tell him or her if you are allergic to any medicine. Keep a list of the medicines, vitamins, and herbs you take. Include the amounts, and when and why you take them. Bring the list or the pill bottles to follow-up visits. Carry your medicine list with you in case of an emergency.    Follow up with your healthcare provider as directed: Write down your questions so you remember to ask them during your visits.     Apply heat to decrease pain and cramping: Sit in a warm bath, or place a heating pad (turned on low) or a hot water bottle on your abdomen. Do this for 15 to 20 minutes every hour for as many days as directed.        © Copyright [employer] 2019 All illustrations and images included in CareNotes are the copyrighted property of [employer] or [employer].      back to top                      © Copyright [employer] 2019

## 2019-06-30 NOTE — ED PROVIDER NOTE - OBJECTIVE STATEMENT
49 y/o female with hx of diverticulitis c/o RLQ abd pain x 2 days. pt states pain began yesterday and increased today. no fever or chills. + dull achy constant pain. no n/v./d. no constipation. no back pain. no urinary sx's. no vag d/c or bleeding. no further complaints.

## 2019-06-30 NOTE — ED ADULT NURSE NOTE - NSIMPLEMENTINTERV_GEN_ALL_ED
Implemented All Universal Safety Interventions:  [place] to call system. Call bell, personal items and telephone within reach. Instruct patient to call for assistance. Room bathroom lighting operational. Non-slip footwear when patient is off stretcher. Physically safe environment: no spills, clutter or unnecessary equipment. Stretcher in lowest position, wheels locked, appropriate side rails in place.

## 2019-06-30 NOTE — ED ADULT TRIAGE NOTE - CHIEF COMPLAINT QUOTE
Patient complaining of RLQ abdominal pain, radiating to right lower back, nausea and vomiting since yesterday.  Patient denies any diarrhea, urinary symptoms, vaginal bleeding or any other complaints at this time.

## 2019-07-02 LAB
-  AMPICILLIN/SULBACTAM: SIGNIFICANT CHANGE UP
-  AMPICILLIN: SIGNIFICANT CHANGE UP
-  CEFAZOLIN: SIGNIFICANT CHANGE UP
-  CEFTRIAXONE: SIGNIFICANT CHANGE UP
-  CIPROFLOXACIN: SIGNIFICANT CHANGE UP
-  GENTAMICIN: SIGNIFICANT CHANGE UP
-  NITROFURANTOIN: SIGNIFICANT CHANGE UP
-  PIPERACILLIN/TAZOBACTAM: SIGNIFICANT CHANGE UP
-  TOBRAMYCIN: SIGNIFICANT CHANGE UP
-  TRIMETHOPRIM/SULFAMETHOXAZOLE: SIGNIFICANT CHANGE UP
CULTURE RESULTS: SIGNIFICANT CHANGE UP
METHOD TYPE: SIGNIFICANT CHANGE UP
METHOD TYPE: SIGNIFICANT CHANGE UP
ORGANISM # SPEC MICROSCOPIC CNT: SIGNIFICANT CHANGE UP
SPECIMEN SOURCE: SIGNIFICANT CHANGE UP

## 2020-01-23 ENCOUNTER — RESULT REVIEW (OUTPATIENT)
Age: 50
End: 2020-01-23

## 2020-01-23 ENCOUNTER — OUTPATIENT (OUTPATIENT)
Dept: OUTPATIENT SERVICES | Facility: HOSPITAL | Age: 50
LOS: 1 days | End: 2020-01-23
Payer: COMMERCIAL

## 2020-01-23 DIAGNOSIS — Z98.51 TUBAL LIGATION STATUS: Chronic | ICD-10-CM

## 2020-01-23 DIAGNOSIS — Z98.890 OTHER SPECIFIED POSTPROCEDURAL STATES: Chronic | ICD-10-CM

## 2020-01-23 DIAGNOSIS — Z90.49 ACQUIRED ABSENCE OF OTHER SPECIFIED PARTS OF DIGESTIVE TRACT: Chronic | ICD-10-CM

## 2020-01-23 DIAGNOSIS — R23.8 OTHER SKIN CHANGES: ICD-10-CM

## 2020-01-23 PROCEDURE — 88305 TISSUE EXAM BY PATHOLOGIST: CPT

## 2020-01-23 PROCEDURE — 88305 TISSUE EXAM BY PATHOLOGIST: CPT | Mod: 26

## 2020-01-24 PROBLEM — K57.92 DIVERTICULITIS OF INTESTINE, PART UNSPECIFIED, WITHOUT PERFORATION OR ABSCESS WITHOUT BLEEDING: Chronic | Status: ACTIVE | Noted: 2019-06-30

## 2020-02-03 LAB — SURGICAL PATHOLOGY STUDY: SIGNIFICANT CHANGE UP

## 2020-09-22 ENCOUNTER — APPOINTMENT (OUTPATIENT)
Dept: CT IMAGING | Facility: HOSPITAL | Age: 50
End: 2020-09-22

## 2021-01-21 ENCOUNTER — EMERGENCY (EMERGENCY)
Facility: HOSPITAL | Age: 51
LOS: 1 days | Discharge: HOME | End: 2021-01-21
Admitting: EMERGENCY MEDICINE
Payer: COMMERCIAL

## 2021-01-21 VITALS
OXYGEN SATURATION: 98 % | DIASTOLIC BLOOD PRESSURE: 84 MMHG | RESPIRATION RATE: 18 BRPM | HEART RATE: 80 BPM | SYSTOLIC BLOOD PRESSURE: 128 MMHG | TEMPERATURE: 98 F | HEIGHT: 65 IN

## 2021-01-21 DIAGNOSIS — Z20.822 CONTACT WITH AND (SUSPECTED) EXPOSURE TO COVID-19: ICD-10-CM

## 2021-01-21 DIAGNOSIS — Z98.890 OTHER SPECIFIED POSTPROCEDURAL STATES: Chronic | ICD-10-CM

## 2021-01-21 DIAGNOSIS — Z90.49 ACQUIRED ABSENCE OF OTHER SPECIFIED PARTS OF DIGESTIVE TRACT: Chronic | ICD-10-CM

## 2021-01-21 DIAGNOSIS — Z98.51 TUBAL LIGATION STATUS: Chronic | ICD-10-CM

## 2021-01-21 LAB — SARS-COV-2 RNA SPEC QL NAA+PROBE: SIGNIFICANT CHANGE UP

## 2021-01-21 PROCEDURE — 99283 EMERGENCY DEPT VISIT LOW MDM: CPT

## 2021-01-21 PROCEDURE — U0005: CPT

## 2021-01-21 PROCEDURE — U0003: CPT

## 2021-01-21 NOTE — ED PROVIDER NOTE - PATIENT PORTAL LINK FT
You can access the FollowMyHealth Patient Portal offered by [facility] by registering at the following website: [email or web address]. By joining [employer]’s FollowMyHealth portal, you will also be able to view your health information using other applications (apps) compatible with our system.

## 2021-02-24 ENCOUNTER — APPOINTMENT (OUTPATIENT)
Dept: OTOLARYNGOLOGY | Facility: CLINIC | Age: 51
End: 2021-02-24

## 2021-07-14 NOTE — ED ADULT TRIAGE NOTE - AS O2 DELIVERY
Assessment & Plan     Osteopenia of lumbar spine with a prior History of traumatic fracture of vertebra.   -No known history of vertebral fragility fracture.   -DEXA would then be consistent with Osteopenia in this case.  Recommended weight bearing long with OTC Calcium 600 mg with Vitamin D 400 units BID- patient to try a different OTC product.   -Repeat DEXA in 2 years.    Pill dysphagia  - Adult Gastro Ref - Consult Only    Stage 3a chronic kidney disease, stable  - Patient education and recommended to avoid NSAID and keep well hydrated daily. Will continue to monitor periodically. Patient verbalized understanding.    Essential hypertension with goal blood pressure less than 140/90. Initially elevated.   - Repeat BP within goal.   - Continue current management.      Return in about 6 months (around 1/14/2022) for Medication check.    Ame Dobson MD  [facility] JEREMY Stevens is a 86 year old who presents for the following health issues     HPI     Osteoporosis  Dexa scan completed 6/21/21  States she tried taking the calcium and vitamin D and felt sick afterwards.  Becomes sick to her stomach, followed by headaches.   If she should continue this supplement she is wanting to know if she should still also be taking a multivitamin.     Would like to discuss recent DEXA scan  States that the Vertebral fracture she sustained at age 58 was a traumatic fracture due to a sledding injury.   Denies any known fragility fracture.   States that in the past she was on Boniva x 2 years stopped > 20 years prior to 2015.     ADDITIONAL CONCERNS  Reports increasing concerns with dysphagia with taking her pills.   States that in the past she's needed dilatation of the esophagus.   Has not seen GI in > 2 years.     Blood pressure is slightly elevated in the clinic today.   HYPERTENSION - Patient has longstanding history of HTN , currently denies any symptoms referable to elevated blood pressure.  Specifically denies chest pain, palpitations, dyspnea, orthopnea, PND or peripheral edema. Blood pressure readings have been in normal range. Current medication regimen is as listed below. Patient denies any side effects of medication.       CKD- stage 3.   Recent kidney function tests-   Component      Latest Ref Rng & Units 5/7/2021   Creatinine      0.52 - 1.04 mg/dL 0.97   GFR Estimate      >60 mL/min/1.73:m2 54 (L)           Review of Systems   Constitutional, HEENT, cardiovascular, pulmonary, gi and gu systems are negative, except as otherwise noted.      Objective    /78   Pulse 79   Temp 97.1  F (36.2  C) (Tympanic)   Resp 16   Wt 61.4 kg (135 lb 6.4 oz)   LMP 10/01/1986   SpO2 96%   Breastfeeding No   BMI 23.69 kg/m    Body mass index is 23.69 kg/m .  Physical Exam   GENERAL: healthy, alert and no distress  PSYCH: mentation appears normal, affect normal/bright    DATA  Recent DEXA reviewed and discussed with patient.   RISK FACTORS:  Post-menopausal, Height loss of 1.5 inches,  History of vertebral fracture at about age 58, unclear if traumatic     CURRENT TREATMENT:  None listed, previous Boniva, stopped prior to 2015     FINDINGS:               Lumbar Spine (L1-L4)      T-score:  -2.0, degenerative changes present               Left Femoral Neck            T-score:  -0.3               Right Femoral Neck          T-score:  0.0                          Lumbar (L1-L4) BMD: 0.936  Previous: 0.953                          Total Hip Mean BMD: 1.013  Previous: 1.041       IMPRESSION  Severe osteoporosis if the spine fracture is considered a fragility fracture. If it is traumatic, the diagnosis is osteopenia. Degenerative changes at the lumbar spine may falsely elevate results.      There has been no significant change in bone density of the lumbar spine. There has been no significant change in bone density of the hip(s).   Recommendations include ensuring adequate Calcium and Vitamin D.        room air

## 2021-08-31 ENCOUNTER — TRANSCRIPTION ENCOUNTER (OUTPATIENT)
Age: 51
End: 2021-08-31

## 2021-09-01 ENCOUNTER — OUTPATIENT (OUTPATIENT)
Dept: OUTPATIENT SERVICES | Facility: HOSPITAL | Age: 51
LOS: 1 days | Discharge: HOME | End: 2021-09-01
Payer: COMMERCIAL

## 2021-09-01 ENCOUNTER — RESULT REVIEW (OUTPATIENT)
Age: 51
End: 2021-09-01

## 2021-09-01 DIAGNOSIS — Z98.890 OTHER SPECIFIED POSTPROCEDURAL STATES: Chronic | ICD-10-CM

## 2021-09-01 DIAGNOSIS — Z90.49 ACQUIRED ABSENCE OF OTHER SPECIFIED PARTS OF DIGESTIVE TRACT: Chronic | ICD-10-CM

## 2021-09-01 DIAGNOSIS — Z98.51 TUBAL LIGATION STATUS: Chronic | ICD-10-CM

## 2021-09-01 PROCEDURE — 88300 SURGICAL PATH GROSS: CPT | Mod: 26

## 2021-10-05 LAB — SURGICAL PATHOLOGY STUDY: SIGNIFICANT CHANGE UP

## 2022-01-10 ENCOUNTER — EMERGENCY (EMERGENCY)
Facility: HOSPITAL | Age: 52
LOS: 1 days | Discharge: HOME | End: 2022-01-10
Attending: EMERGENCY MEDICINE | Admitting: EMERGENCY MEDICINE
Payer: COMMERCIAL

## 2022-01-10 VITALS
DIASTOLIC BLOOD PRESSURE: 95 MMHG | SYSTOLIC BLOOD PRESSURE: 136 MMHG | HEART RATE: 105 BPM | HEIGHT: 65 IN | TEMPERATURE: 98 F | OXYGEN SATURATION: 99 % | RESPIRATION RATE: 18 BRPM

## 2022-01-10 DIAGNOSIS — U07.1 COVID-19: ICD-10-CM

## 2022-01-10 DIAGNOSIS — Z98.890 OTHER SPECIFIED POSTPROCEDURAL STATES: Chronic | ICD-10-CM

## 2022-01-10 DIAGNOSIS — Z98.51 TUBAL LIGATION STATUS: ICD-10-CM

## 2022-01-10 DIAGNOSIS — Z90.49 ACQUIRED ABSENCE OF OTHER SPECIFIED PARTS OF DIGESTIVE TRACT: ICD-10-CM

## 2022-01-10 DIAGNOSIS — Z98.51 TUBAL LIGATION STATUS: Chronic | ICD-10-CM

## 2022-01-10 DIAGNOSIS — Z90.49 ACQUIRED ABSENCE OF OTHER SPECIFIED PARTS OF DIGESTIVE TRACT: Chronic | ICD-10-CM

## 2022-01-10 DIAGNOSIS — E86.0 DEHYDRATION: ICD-10-CM

## 2022-01-10 DIAGNOSIS — R10.9 UNSPECIFIED ABDOMINAL PAIN: ICD-10-CM

## 2022-01-10 LAB
ALBUMIN SERPL ELPH-MCNC: 4.6 G/DL — SIGNIFICANT CHANGE UP (ref 3.3–5)
ALP SERPL-CCNC: 70 U/L — SIGNIFICANT CHANGE UP (ref 40–120)
ALT FLD-CCNC: 11 U/L — SIGNIFICANT CHANGE UP (ref 10–45)
ANION GAP SERPL CALC-SCNC: 13 MMOL/L — SIGNIFICANT CHANGE UP (ref 5–17)
APPEARANCE UR: CLEAR — SIGNIFICANT CHANGE UP
AST SERPL-CCNC: 15 U/L — SIGNIFICANT CHANGE UP (ref 10–40)
BACTERIA # UR AUTO: PRESENT /HPF
BASOPHILS # BLD AUTO: 0.07 K/UL — SIGNIFICANT CHANGE UP (ref 0–0.2)
BASOPHILS NFR BLD AUTO: 0.9 % — SIGNIFICANT CHANGE UP (ref 0–2)
BILIRUB SERPL-MCNC: 0.5 MG/DL — SIGNIFICANT CHANGE UP (ref 0.2–1.2)
BILIRUB UR-MCNC: NEGATIVE — SIGNIFICANT CHANGE UP
BUN SERPL-MCNC: 7 MG/DL — SIGNIFICANT CHANGE UP (ref 7–23)
CALCIUM SERPL-MCNC: 9.9 MG/DL — SIGNIFICANT CHANGE UP (ref 8.4–10.5)
CHLORIDE SERPL-SCNC: 104 MMOL/L — SIGNIFICANT CHANGE UP (ref 96–108)
CO2 SERPL-SCNC: 22 MMOL/L — SIGNIFICANT CHANGE UP (ref 22–31)
COLOR SPEC: YELLOW — SIGNIFICANT CHANGE UP
CREAT SERPL-MCNC: 0.71 MG/DL — SIGNIFICANT CHANGE UP (ref 0.5–1.3)
DIFF PNL FLD: ABNORMAL
EOSINOPHIL # BLD AUTO: 0 K/UL — SIGNIFICANT CHANGE UP (ref 0–0.5)
EOSINOPHIL NFR BLD AUTO: 0 % — SIGNIFICANT CHANGE UP (ref 0–6)
EPI CELLS # UR: ABNORMAL /HPF (ref 0–5)
GIANT PLATELETS BLD QL SMEAR: PRESENT — SIGNIFICANT CHANGE UP
GLUCOSE SERPL-MCNC: 114 MG/DL — HIGH (ref 70–99)
GLUCOSE UR QL: NEGATIVE — SIGNIFICANT CHANGE UP
HCT VFR BLD CALC: 42.5 % — SIGNIFICANT CHANGE UP (ref 34.5–45)
HGB BLD-MCNC: 14.7 G/DL — SIGNIFICANT CHANGE UP (ref 11.5–15.5)
KETONES UR-MCNC: >=80 MG/DL
LEUKOCYTE ESTERASE UR-ACNC: NEGATIVE — SIGNIFICANT CHANGE UP
LIDOCAIN IGE QN: 21 U/L — SIGNIFICANT CHANGE UP (ref 7–60)
LYMPHOCYTES # BLD AUTO: 0.32 K/UL — LOW (ref 1–3.3)
LYMPHOCYTES # BLD AUTO: 4.4 % — LOW (ref 13–44)
MANUAL SMEAR VERIFICATION: SIGNIFICANT CHANGE UP
MCHC RBC-ENTMCNC: 30.5 PG — SIGNIFICANT CHANGE UP (ref 27–34)
MCHC RBC-ENTMCNC: 34.6 GM/DL — SIGNIFICANT CHANGE UP (ref 32–36)
MCV RBC AUTO: 88.2 FL — SIGNIFICANT CHANGE UP (ref 80–100)
MONOCYTES # BLD AUTO: 0.19 K/UL — SIGNIFICANT CHANGE UP (ref 0–0.9)
MONOCYTES NFR BLD AUTO: 2.6 % — SIGNIFICANT CHANGE UP (ref 2–14)
NEUTROPHILS # BLD AUTO: 6.69 K/UL — SIGNIFICANT CHANGE UP (ref 1.8–7.4)
NEUTROPHILS NFR BLD AUTO: 86 % — HIGH (ref 43–77)
NEUTS BAND # BLD: 5.2 % — SIGNIFICANT CHANGE UP (ref 0–8)
NITRITE UR-MCNC: NEGATIVE — SIGNIFICANT CHANGE UP
PH UR: 6 — SIGNIFICANT CHANGE UP (ref 5–8)
PLAT MORPH BLD: ABNORMAL
PLATELET # BLD AUTO: 233 K/UL — SIGNIFICANT CHANGE UP (ref 150–400)
POTASSIUM SERPL-MCNC: 4.1 MMOL/L — SIGNIFICANT CHANGE UP (ref 3.5–5.3)
POTASSIUM SERPL-SCNC: 4.1 MMOL/L — SIGNIFICANT CHANGE UP (ref 3.5–5.3)
PROT SERPL-MCNC: 7.4 G/DL — SIGNIFICANT CHANGE UP (ref 6–8.3)
PROT UR-MCNC: NEGATIVE MG/DL — SIGNIFICANT CHANGE UP
RBC # BLD: 4.82 M/UL — SIGNIFICANT CHANGE UP (ref 3.8–5.2)
RBC # FLD: 11.9 % — SIGNIFICANT CHANGE UP (ref 10.3–14.5)
RBC BLD AUTO: NORMAL — SIGNIFICANT CHANGE UP
RBC CASTS # UR COMP ASSIST: ABNORMAL /HPF
SODIUM SERPL-SCNC: 139 MMOL/L — SIGNIFICANT CHANGE UP (ref 135–145)
SP GR SPEC: >=1.03 — SIGNIFICANT CHANGE UP (ref 1–1.03)
UROBILINOGEN FLD QL: 0.2 E.U./DL — SIGNIFICANT CHANGE UP
VARIANT LYMPHS # BLD: 0.9 % — SIGNIFICANT CHANGE UP (ref 0–6)
WBC # BLD: 7.34 K/UL — SIGNIFICANT CHANGE UP (ref 3.8–10.5)
WBC # FLD AUTO: 7.34 K/UL — SIGNIFICANT CHANGE UP (ref 3.8–10.5)
WBC UR QL: < 5 /HPF — SIGNIFICANT CHANGE UP

## 2022-01-10 PROCEDURE — 99285 EMERGENCY DEPT VISIT HI MDM: CPT

## 2022-01-10 PROCEDURE — 74177 CT ABD & PELVIS W/CONTRAST: CPT | Mod: 26,MG

## 2022-01-10 PROCEDURE — G1004: CPT

## 2022-01-10 RX ORDER — IOHEXOL 300 MG/ML
30 INJECTION, SOLUTION INTRAVENOUS ONCE
Refills: 0 | Status: COMPLETED | OUTPATIENT
Start: 2022-01-10 | End: 2022-01-10

## 2022-01-10 RX ORDER — SODIUM CHLORIDE 9 MG/ML
1000 INJECTION INTRAMUSCULAR; INTRAVENOUS; SUBCUTANEOUS ONCE
Refills: 0 | Status: COMPLETED | OUTPATIENT
Start: 2022-01-10 | End: 2022-01-10

## 2022-01-10 RX ORDER — ONDANSETRON 8 MG/1
4 TABLET, FILM COATED ORAL ONCE
Refills: 0 | Status: COMPLETED | OUTPATIENT
Start: 2022-01-10 | End: 2022-01-10

## 2022-01-10 RX ORDER — ACETAMINOPHEN 500 MG
1000 TABLET ORAL ONCE
Refills: 0 | Status: COMPLETED | OUTPATIENT
Start: 2022-01-10 | End: 2022-01-10

## 2022-01-10 RX ADMIN — SODIUM CHLORIDE 1000 MILLILITER(S): 9 INJECTION INTRAMUSCULAR; INTRAVENOUS; SUBCUTANEOUS at 23:20

## 2022-01-10 RX ADMIN — SODIUM CHLORIDE 1000 MILLILITER(S): 9 INJECTION INTRAMUSCULAR; INTRAVENOUS; SUBCUTANEOUS at 23:00

## 2022-01-10 RX ADMIN — Medication 400 MILLIGRAM(S): at 21:47

## 2022-01-10 RX ADMIN — Medication 1000 MILLIGRAM(S): at 22:15

## 2022-01-10 RX ADMIN — ONDANSETRON 4 MILLIGRAM(S): 8 TABLET, FILM COATED ORAL at 21:45

## 2022-01-10 RX ADMIN — SODIUM CHLORIDE 1000 MILLILITER(S): 9 INJECTION INTRAMUSCULAR; INTRAVENOUS; SUBCUTANEOUS at 21:47

## 2022-01-10 RX ADMIN — IOHEXOL 30 MILLILITER(S): 300 INJECTION, SOLUTION INTRAVENOUS at 21:50

## 2022-01-10 NOTE — ED PROVIDER NOTE - OBJECTIVE STATEMENT
The pt is a 50 y/o F, who presents to ED c/o nasal congestion, malaise over past few d, now n/v. Pt states that has been unable to keep anything po down all day, emesis x5 episodes, + nausea, unable to keep po down, + headache and chills. Vaccinated for covid. PSH cholecystectomy, hernia repair, bowel resection (diverticulitis).  Denies diarrhea, constipation, abd pain, flank pain, dysuria, fever, cp, sob, palpitations. The pt is a 52 y/o F, who presents to ED c/o nasal congestion, malaise over past few d, now n/v. Pt states that has been unable to keep anything po down all day, nbnb emesis x5 episodes, + nausea, unable to keep po down, + headache and chills. Vaccinated for covid. PSH cholecystectomy, hernia repair, bowel resection (diverticulitis).  Denies diarrhea, constipation, abd pain, flank pain, dysuria, fever, cp, sob, palpitations.

## 2022-01-10 NOTE — ED ADULT NURSE NOTE - NSIMPLEMENTINTERV_GEN_ALL_ED
Implemented All Universal Safety Interventions:  [place] to call system. Call bell, personal items and telephone within reach. Instruct patient to call for assistance. Room bathroom lighting operational. Non-slip footwear when patient is off stretcher. Physically safe environment: no spills, clutter or unnecessary equipment. Stretcher in lowest position, wheels locked, appropriate side rails in place.

## 2022-01-10 NOTE — ED ADULT NURSE NOTE - OBJECTIVE STATEMENT
pt present to Ed for generalized body aches since this morning, worsening since this morning, +vomit, unable to tolerate PO intake, epigastric pain and unable to pass gas since yesterday afternoon. LBM 1/9/2022 afternoon, LMp 1/1/2022. +bowel sounds in all 4 quadrants. -dehydration +pain +chills. pt is A&Ox4, no resp distress noted during the assessment.

## 2022-01-10 NOTE — ED PROVIDER NOTE - CLINICAL SUMMARY MEDICAL DECISION MAKING FREE TEXT BOX
pt w/nasal congestion and malaise, today w/n/v and unable to keep po down, ? covid -- swab sent, no resp distress or complaints, pt well appearing, clinically dehydrated, has had cholecystectomy/hernia repair and bowel resection in past - known to surg service and requesting ct r/o sbo, labs    , given ivf and antiemetics, given tyl for h/a, hemodynamically stable, pt w/nasal congestion and malaise, today w/n/v and unable to keep po down, high suspicion for covid -- swab sent, no resp distress or complaints, pt well appearing, clinically dehydrated, has had cholecystectomy/hernia repair and bowel resection in past - known to surg service and requesting ct r/o sbo, labs wnl, given ivf and antiemetics, given tyl for h/a, hemodynamically stable, pt symptomatically improved w/ivf and antiemetics, ct neg, stable for dc, symptom control and supportive care discussed at length, pt understands and agrees w/plan, strict return precautions given

## 2022-01-10 NOTE — ED ADULT NURSE NOTE - NSICDXPASTSURGICALHX_GEN_ALL_CORE_FT
PAST SURGICAL HISTORY:  H/O tubal ligation     History of cholecystectomy     History of surgery elbow    History of umbilical hernia repair

## 2022-01-10 NOTE — ED PROVIDER NOTE - PATIENT PORTAL LINK FT
You can access the FollowMyHealth Patient Portal offered by [facility] by registering at the following website: [email or web address]. By joining [facility]’s FollowMyHealth portal, you will also be able to view your health information using other applications (apps) compatible with our system.

## 2022-01-10 NOTE — ED PROVIDER NOTE - NSFOLLOWUPINSTRUCTIONS_ED_ALL_ED_FT
DRINK PLENTY OF FLUIDS, REST, EAT BLAND / EASY TO DIGEST FOODS, SELF ISOLATE - STRONG SUSPICION FOR COVID19, FOLLOW UP WITH YOUR PMD, RETURN IMMEDIATELY FOR ANY WORSENING OR CONCERNING SYMPTOMS  COVID-19 (Coronavirus Disease 2019)  WHAT YOU NEED TO KNOW:  COVID-19 is the disease caused by a coronavirus first discovered in December 2019. Coronaviruses generally cause upper respiratory (nose, throat, and lung) infections, such as a cold. The 2019 virus spreads quickly and easily. It can be spread starting 2 to 3 days before symptoms even begin.  DISCHARGE INSTRUCTIONS:  Call your local emergency number (911 in the ) if:   •You have trouble breathing or shortness of breath at rest.  •You have chest pain or pressure that lasts longer than 5 minutes.  •You become confused or hard to wake.  •Your lips or face are blue.  Return to the emergency department if:   •You have a fever of 104°F (40°C) or higher.  Call your doctor if:   •You have symptoms of COVID-19.  •You have questions or concerns about your condition or care.  Medicines: You may need any of the following:   •Decongestants help reduce nasal congestion and help you breathe more easily. If you take decongestant pills, they may make you feel restless or cause problems with your sleep. Do not use decongestant sprays for more than a few days.  •Cough suppressants help reduce coughing. Ask your healthcare provider which type of cough medicine is best for you.  •Acetaminophen decreases pain and fever. It is available without a doctor's order. Ask how much to take and how often to take it. Follow directions. Read the labels of all other medicines you are using to see if they also contain acetaminophen, or ask your doctor or pharmacist. Acetaminophen can cause liver damage if not taken correctly. Do not use more than 4 grams (4,000 milligrams) total of acetaminophen in one day.   •NSAIDs, such as ibuprofen, help decrease swelling, pain, and fever. This medicine is available with or without a doctor's order. NSAIDs can cause stomach bleeding or kidney problems in certain people. If you take blood thinner medicine, always ask your healthcare provider if NSAIDs are safe for you. Always read the medicine label and follow directions.  •Blood thinners help prevent blood clots. Clots can cause strokes, heart attacks, and death. The following are general safety guidelines to follow while you are taking a blood thinner:?Watch for bleeding and bruising while you take blood thinners. Watch for bleeding from your gums or nose. Watch for blood in your urine and bowel movements. Use a soft washcloth on your skin, and a soft toothbrush to brush your teeth. This can keep your skin and gums from bleeding. If you shave, use an electric shaver. Do not play contact sports.   ?Tell your dentist and other healthcare providers that you take a blood thinner. Wear a bracelet or necklace that says you take this medicine.   ?Do not start or stop any other medicines unless your healthcare provider tells you to. Many medicines cannot be used with blood thinners.  ?Take your blood thinner exactly as prescribed by your healthcare provider. Do not skip does or take less than prescribed. Tell your provider right away if you forget to take your blood thinner, or if you take too much.  ?Warfarin is a blood thinner that you may need to take. The following are things you should be aware of if you take warfarin: ?Foods and medicines can affect the amount of warfarin in your blood. Do not make major changes to your diet while you take warfarin. Warfarin works best when you eat about the same amount of vitamin K every day. Vitamin K is found in green leafy vegetables and certain other foods. Ask for more information about what to eat when you are taking warfarin.  ?You will need to see your healthcare provider for follow-up visits when you are on warfarin. You will need regular blood tests. These tests are used to decide how much medicine you need.   •Take your medicine as directed. Contact your healthcare provider if you think your medicine is not helping or if you have side effects. Tell him or her if you are allergic to any medicine. Keep a list of the medicines, vitamins, and herbs you take. Include the amounts, and when and why you take them. Bring the list or the pill bottles to follow-up visits. Carry your medicine list with you in case of an emergency.  What you need to know about variants: The virus has changed into several new forms (called variants) since it was discovered. The variants may be more contagious (easily spread) than the original form. Some may also cause more severe illness than others.  What you need to know about COVID-19 vaccines: Healthcare providers recommend a COVID-19 vaccine, even if you have already had COVID-19. You are considered fully vaccinated against COVID-19 two weeks after the final dose of any COVID-19 vaccine. Let your healthcare provider know when you have received the final dose of the vaccine. Make a copy of your vaccination card. Keep the original with you in case you need to show it. Keep the copy in a safe place.  •COVID-19 vaccines are given as a shot in 1 or 2 doses.   Vaccination is recommended for everyone 5 years or older. One 2-dose vaccine is fully approved for those 16 or older. This vaccine also has an emergency use authorization (EUA) for children 5 to 15 years old. No vaccine is currently available for children younger than 5 years. An additional (booster) dose is also recommended for all adults 18 or older. The booster can be a different brand of the COVID-19 vaccine than you originally received. The timing for the booster depends on the type of vaccine you received:?1-dose vaccine: The booster is given at least 2 months after you received the vaccine.  ?2-dose vaccine: The booster is given at least 6 months after the second dose.  COVID-19 Immunization Schedule  •Continue social distancing and other measures, even after you get the vaccine. Although it is not common, you can become infected after you get the vaccine. You may also be able to pass the virus to others without knowing you are infected.  •After you get the vaccine, check local, national, and international travel rules. You may need to be tested before you travel. Some countries require proof of a negative test before you travel. You may also need to quarantine after you return.  How the 2019 coronavirus spreads:   •Droplets are the main way all coronaviruses spread. The virus travels in droplets that form when a person talks, sings, coughs, or sneezes. The droplets can also float in the air for minutes or hours. Infection happens when you breathe in the droplets or get them in your eyes or nose. Close personal contact with an infected person increases your risk for infection. This means being within 6 feet (2 meters) of the person for at least 15 minutes over 24 hours.  •Person-to-person contact can spread the virus. For example, a person with the virus on his or her hands can spread it by shaking hands with someone.  •The virus can stay on objects and surfaces for up to 3 days. You may become infected by touching the object or surface and then touching your eyes or mouth.  Help lower the risk for COVID-19:   •Wash your hands often throughout the day. Use soap and water. Rub your soapy hands together, lacing your fingers, for at least 20 seconds. Rinse with warm, running water. Dry your hands with a clean towel or paper towel. Use hand  that contains alcohol if soap and water are not available. Teach children how to wash their hands and use hand .  Handwashing  •Cover sneezes and coughs. Turn your face away and cover your mouth and nose with a tissue. Throw the tissue away. Use the bend of your arm if a tissue is not available. Then wash your hands well with soap and water or use hand . Teach children how to cover a cough or sneeze.  •Wear a face covering (mask) when needed. Use a cloth covering with at least 2 layers. You can also create layers by putting a cloth covering over a disposable non-medical mask. Cover your mouth and your nose.  How to Wear a Face Covering (Mask)  •Follow worldwide, national, and local social distancing guidelines. Keep at least 6 feet (2 meters) between you and others.  •Try not to touch your face. If you get the virus on your hands, you can transfer it to your eyes, nose, or mouth and become infected. You can also transfer it to objects, surfaces, or people.  •Clean and disinfect high-touch surfaces and objects often. Use disinfecting wipes, or make a solution of 4 teaspoons of bleach in 1 quart (4 cups) of water.  •Ask about other vaccines you may need. Get the influenza (flu) vaccine as soon as recommended each year, usually starting in September or October. Get the pneumonia vaccine if recommended. Your healthcare provider can tell you if you should also get other vaccines, and when to get them.  Prevent COVID-19 Infection  Follow social distancing guidelines: National and local social distancing rules vary. Rules and restrictions may change over time as restrictions are lifted. The following are general guidelines:   •Stay home if you are sick or think you may have COVID-19. It is important to stay home if you are waiting for a testing appointment or for test results.  •Avoid close physical contact with anyone who does not live in your home. Do not shake hands with, hug, or kiss a person as a greeting. If you must use public transportation (such as a bus or subway), try to sit or stand away from others. Wear your face covering.  •Avoid in-person gatherings and crowds. Attend virtually if possible.  Follow up with your doctor as directed: Write down your questions so you remember to ask them during your visits.  For more information:   •Centers for Disease Control and Prevention  [address] [place] [address]  Phone: 1-165.960.6259  Web Address: http://www.cdc.gov

## 2022-01-11 ENCOUNTER — TRANSCRIPTION ENCOUNTER (OUTPATIENT)
Age: 52
End: 2022-01-11

## 2022-01-11 VITALS
RESPIRATION RATE: 17 BRPM | DIASTOLIC BLOOD PRESSURE: 66 MMHG | SYSTOLIC BLOOD PRESSURE: 103 MMHG | OXYGEN SATURATION: 98 % | TEMPERATURE: 98 F | HEART RATE: 90 BPM

## 2022-01-11 LAB — SARS-COV-2 RNA SPEC QL NAA+PROBE: DETECTED

## 2022-01-11 PROCEDURE — 96375 TX/PRO/DX INJ NEW DRUG ADDON: CPT

## 2022-01-11 PROCEDURE — 96374 THER/PROPH/DIAG INJ IV PUSH: CPT | Mod: XU

## 2022-01-11 PROCEDURE — 85025 COMPLETE CBC W/AUTO DIFF WBC: CPT

## 2022-01-11 PROCEDURE — U0005: CPT

## 2022-01-11 PROCEDURE — 96361 HYDRATE IV INFUSION ADD-ON: CPT

## 2022-01-11 PROCEDURE — 80053 COMPREHEN METABOLIC PANEL: CPT

## 2022-01-11 PROCEDURE — 74150 CT ABDOMEN W/O CONTRAST: CPT | Mod: 26

## 2022-01-11 PROCEDURE — 81001 URINALYSIS AUTO W/SCOPE: CPT

## 2022-01-11 PROCEDURE — 36415 COLL VENOUS BLD VENIPUNCTURE: CPT

## 2022-01-11 PROCEDURE — 83690 ASSAY OF LIPASE: CPT

## 2022-01-11 PROCEDURE — 74150 CT ABDOMEN W/O CONTRAST: CPT

## 2022-01-11 PROCEDURE — U0003: CPT

## 2022-01-11 PROCEDURE — G1004: CPT

## 2022-01-11 PROCEDURE — 99284 EMERGENCY DEPT VISIT MOD MDM: CPT | Mod: 25

## 2022-01-11 PROCEDURE — 74177 CT ABD & PELVIS W/CONTRAST: CPT | Mod: MG

## 2022-01-11 RX ORDER — ONDANSETRON 8 MG/1
1 TABLET, FILM COATED ORAL
Qty: 9 | Refills: 0
Start: 2022-01-11 | End: 2022-01-13

## 2022-01-11 NOTE — CONSULT NOTE ADULT - NSCONSULTADDITIONALINFOA_GEN_ALL_CORE
SENIOR RESIDENT ADDENDUM  Agree with above. 51F well-known to surgical service a/w URI, nausea, vomiting and mild diffuse abdominal pain. Last flatus and BM yesterday. Last colonoscopy 5 years ago, normal. VSS, exam benign. Labs and CT normal. Likely viral gastritis.

## 2022-01-11 NOTE — CONSULT NOTE ADULT - ASSESSMENT
51F with pmh of diverticulosis s/p laparoscopic sigmoidectomy with SBR (2017), umbilical hernia repair (2016), laparoscopic cholecystectomy, b/l salpingectomy (1994) who presents to [facility] from home with 1 day history of mylagias with associated vomiting and abdominal pain. Clinical and radiographic findings c/w viral infection gastroenteritis vs COVID19 infection.     No acute surgical intervention  Recommend continuing with supportive care  Should vomiting or abdominal persist recommend return to hospital for revaluation  Patient seen and evaluated by surgery attending  Ok to discharge home from surgical perspective  Plan discussed with patient, attending, and ED provider and all were in agreement.

## 2022-01-11 NOTE — CONSULT NOTE ADULT - SUBJECTIVE AND OBJECTIVE BOX
HPI:  51F with pmh of diverticulosis s/p laparoscopic sigmoidectomy with SBR (2017), umbilical hernia repair (2016), laparoscopic cholecystectomy, b/l salpingectomy (1994) who presents to [facility] from home with 1 day history of mylagias with associated vomiting and abdominal pain. Symptoms started with myalgias and progressed to NBNB vomiting. She indicated that she developed the abdominal pain after the vomiting episodes and think that it is related to the strain she put on her abdominal wall muscles. She indicated that her last BM and last time passing gas was last night. Endorsed some chills today. Denied fever. She indicated that she was recently diagnosed with upper respiratory tract infection, although she denied coughing, SOB or chest pain. She indicated that she tested negative for COVID this morning. Last Cscope was in 2017 and was found to have diverticulosis. No previous upper endoscopy.     In the ED, she had dry mucous membranes, HR of 105, systolic of 135 mmHg, temp 98F, saturating appropriately on RA. CT A/P notable for mild right renal hydroureteronephrosis without obstructing renal or ureter calculus. No left hydronephrosis obstructing mass. Was bolused 2L NS.     PMH: none  PSH: laparoscopic sigmoidectomy with SBR (2017), umbilical hernia repair (2016), laparoscopic cholecystectomy, b/l salpingectomy (1994)  Allergies: NKDA  Home meds: none          Vital Signs Last 24 Hrs  T(C): 36.7 (10 Zain 2022 21:03), Max: 36.7 (10 Zain 2022 21:03)  T(F): 98 (10 Zain 2022 21:03), Max: 98 (10 Zain 2022 21:03)  HR: 105 (10 Zain 2022 21:03) (105 - 105)  BP: 136/95 (10 Zain 2022 21:03) (136/95 - 136/95)  BP(mean): --  RR: 18 (10 Zain 2022 21:03) (18 - 18)  SpO2: 99% (10 Zain 2022 21:03) (99% - 99%)  I&O's Detail    PHYSICAL EXAM:  General: NAD, resting comfortably in bed  C/V: NSR  Pulm: Nonlabored breathing, no respiratory distress  Abd: soft, mild epigastric TTP. Nondistended. No rebound or guarding.   Extrem: WWP, no edema  Skin: warm, no rashes  Psych: calm, appropriate      LABS:                        14.7   7.34  )-----------( 233      ( 10 Zain 2022 21:52 )             42.5     01-10    139  |  104  |  7   ----------------------------<  114<H>  4.1   |  22  |  0.71    Ca    9.9      10 Zain 2022 21:52    TPro  7.4  /  Alb  4.6  /  TBili  0.5  /  DBili  x   /  AST  15  /  ALT  11  /  AlkPhos  70  01-10      Urinalysis Basic - ( 10 Zain 2022 23:12 )    Color: Yellow / Appearance: Clear / SG: >=1.030 / pH: x  Gluc: x / Ketone: >=80 mg/dL  / Bili: Negative / Urobili: 0.2 E.U./dL   Blood: x / Protein: NEGATIVE mg/dL / Nitrite: NEGATIVE   Leuk Esterase: NEGATIVE / RBC: 5-10 /HPF / WBC < 5 /HPF   Sq Epi: x / Non Sq Epi: 5-10 /HPF / Bacteria: Present /HPF        RADIOLOGY & ADDITIONAL STUDIES:  < from: CT Abdomen and Pelvis w/ Oral Cont and w/ IV Cont (01.10.22 @ 23:59) >  ACC: [id number] EXAM:  CT ABDOMEN ONLY                        ACC: [id number] EXAM:  CT ABDOMEN AND PELVIS OC IC                          PROCEDURE DATE:  01/10/2022          INTERPRETATION:  CT SCAN OF ABDOMEN AND PELVIS    History: Vomiting. Rule outsmall bowel obstruction.    Technique: CT scan of abdomen and pelvis was performed from lung bases   through symphysis pubis. Intravenous and oral contrast material were   utilized. Axial, sagittal and coronal reformatted images were reviewed.    Comparison: CT abdomen pelvis from 6/30/2019.    Findings:  The uppermost portion of the abdomen was not included on the scan.    Lower chest: Normal.    Liver:  The visualized portion of the liver are within normal limits.    Gallbladder: Status post cholecystectomy.    Spleen:  Visualized portions spleen is within normal limits.    Pancreas:  Normal.    Adrenal glands:  Normal.    Kidneys: Symmetric bilateral parenchymal enhancement. Mild right renal   hydroureteronephrosis without obstructing renal or ureter calculus. No   left hydronephrosis obstructing mass. Delayed repeat scan of the abdomen   demonstrate symmetric excretion from bilateral kidneys.    Adenopathy:  No lymphadenopathy in abdomen or pelvis.    Ascites: None.    Gastrointestinal tract: Normal appendix. Normal bowel caliber. No   obstruction or free air. No collection. No significant diverticulosis.   Anastomotic sutures at the level of the sigmoid colon.    Vessels: There is left uterine vasculature dilatation is could be seen   with pelvic congestion.    Pelvic organs: Unremarkable.    Soft tissues: Normal.    Bones: Normal.    Impression:  No bowel obstruction or appendicitis.  Additional findings as above.    --- End of Report ---          KHALED AL TAWIL MD; Resident Radiologist  This document has been electronically signed.  KAY GREGORIO MD; Attending Radiologist  This document has been electronically signed. Jan 11 2022  1:00AM    < end of copied text >

## 2022-01-14 ENCOUNTER — OUTPATIENT (OUTPATIENT)
Dept: OUTPATIENT SERVICES | Facility: HOSPITAL | Age: 52
LOS: 1 days | End: 2022-01-14

## 2022-01-14 ENCOUNTER — APPOINTMENT (OUTPATIENT)
Dept: DISASTER EMERGENCY | Facility: HOSPITAL | Age: 52
End: 2022-01-14

## 2022-01-14 VITALS
DIASTOLIC BLOOD PRESSURE: 83 MMHG | SYSTOLIC BLOOD PRESSURE: 119 MMHG | OXYGEN SATURATION: 98 % | HEART RATE: 58 BPM | TEMPERATURE: 98 F | RESPIRATION RATE: 18 BRPM

## 2022-01-14 VITALS
RESPIRATION RATE: 19 BRPM | TEMPERATURE: 98 F | HEART RATE: 68 BPM | HEIGHT: 65 IN | SYSTOLIC BLOOD PRESSURE: 135 MMHG | WEIGHT: 156.09 LBS | OXYGEN SATURATION: 98 % | DIASTOLIC BLOOD PRESSURE: 89 MMHG

## 2022-01-14 DIAGNOSIS — U07.1 COVID-19: ICD-10-CM

## 2022-01-14 DIAGNOSIS — Z90.49 ACQUIRED ABSENCE OF OTHER SPECIFIED PARTS OF DIGESTIVE TRACT: Chronic | ICD-10-CM

## 2022-01-14 DIAGNOSIS — Z98.890 OTHER SPECIFIED POSTPROCEDURAL STATES: Chronic | ICD-10-CM

## 2022-01-14 DIAGNOSIS — Z98.51 TUBAL LIGATION STATUS: Chronic | ICD-10-CM

## 2022-01-14 RX ORDER — SODIUM CHLORIDE 9 MG/ML
250 INJECTION INTRAMUSCULAR; INTRAVENOUS; SUBCUTANEOUS
Refills: 0 | Status: COMPLETED | OUTPATIENT
Start: 2022-01-14 | End: 2022-01-14

## 2022-01-14 RX ORDER — SOTROVIMAB 62.5 MG/ML
500 INJECTION, SOLUTION, CONCENTRATE INTRAVENOUS ONCE
Refills: 0 | Status: COMPLETED | OUTPATIENT
Start: 2022-01-14 | End: 2022-01-14

## 2022-01-14 RX ADMIN — SOTROVIMAB 116 MILLIGRAM(S): 62.5 INJECTION, SOLUTION, CONCENTRATE INTRAVENOUS at 13:40

## 2022-01-14 RX ADMIN — SODIUM CHLORIDE 100 MILLILITER(S): 9 INJECTION INTRAMUSCULAR; INTRAVENOUS; SUBCUTANEOUS at 14:10

## 2022-01-14 NOTE — MONOCLONAL ANTIBODY INFUSION - ASSESSMENT AND PLAN
ASSESSMENT:  Pt is a 52 y/o F who tested Covid + on 1/11/22 referred by Dr. Herrera who presents to infusion center for Monoclonal antibody infusion (Sotrovimab)  Pt vaccinated x2 Pfizer  Symptoms/ Criteria: N/V, body aches, chills, cough, congestion  Risk Profile includes: diverticulitis under the care of Dr. Leigh (General Surgery)    PLAN:  - infusion procedure explained to patient   -Consent for monoclonal antibody infusion obtained   - Risk & benefits discussed/all questions answered  -infuse  Sotrovimab IV over 30 minutes  -observe patient for one hour post infusion and discharge home

## 2022-01-14 NOTE — MONOCLONAL ANTIBODY INFUSION - EXAM
CC: Monoclonal Antibody Infusion/COVID 19 Positive  51yFemale    exam/findings:  T(C): 36.4 (01-14-22 @ 13:40), Max: 36.4 (01-14-22 @ 13:40)  HR: 68 (01-14-22 @ 13:40) (68 - 68)  BP: 135/89 (01-14-22 @ 13:40) (135/89 - 135/89)  RR: 19 (01-14-22 @ 13:40) (19 - 19)  SpO2: 98% (01-14-22 @ 13:40) (98% - 98%)      PE:   Appearance: NAD	  HEENT:   Normal oral mucosa,   Lymphatic: No lymphadenopathy  Cardiovascular: Normal S1 S2, No JVD, No murmurs, No edema  Respiratory: Lungs clear to auscultation	  Gastrointestinal:  Soft, Non-tender, + BS	  Skin: warm and dry  Neurologic: Non-focal  Extremities: Normal range of motion,

## 2022-01-14 NOTE — MONOCLONAL ANTIBODY INFUSION - HOME MEDICATIONS
ondansetron 4 mg oral tablet, disintegrating , 1 tab(s) orally 3 times a day   ibuprofen 600 mg oral tablet , 1 tab(s) orally every 8 hours   Vicodin 300 mg-5 mg oral tablet , 1 tab(s) orally every 6 hours, As Needed -for severe pain MDD:4  Augmentin 875 mg-125 mg oral tablet , 1 tab(s) orally every 12 hours

## 2022-03-31 ENCOUNTER — APPOINTMENT (OUTPATIENT)
Dept: ORTHOPEDIC SURGERY | Facility: CLINIC | Age: 52
End: 2022-03-31
Payer: COMMERCIAL

## 2022-03-31 VITALS — BODY MASS INDEX: 25.83 KG/M2 | WEIGHT: 155 LBS | HEIGHT: 65 IN

## 2022-03-31 DIAGNOSIS — M75.81 OTHER SHOULDER LESIONS, RIGHT SHOULDER: ICD-10-CM

## 2022-03-31 PROCEDURE — 73030 X-RAY EXAM OF SHOULDER: CPT | Mod: RT

## 2022-03-31 PROCEDURE — 99203 OFFICE O/P NEW LOW 30 MIN: CPT | Mod: 25

## 2022-03-31 PROCEDURE — 20611 DRAIN/INJ JOINT/BURSA W/US: CPT | Mod: RT

## 2022-03-31 PROCEDURE — 76882 US LMTD JT/FCL EVL NVASC XTR: CPT | Mod: 59,RT

## 2022-03-31 NOTE — DISCUSSION/SUMMARY
[de-identified] : ULTRASOUND EVALUATION  REVEALS INFLAMMATORY CHANGES WITHOUT SIGNIFICANT TEAR \par PATIENT HAS ELECTED TO PROCEED WITH KENALOG INJECTION SHOULDER \par RISKS AND BENEFITS DISCUSSED - VERBAL CONSENT OBTAINED \par SEE PROCEDURE NOTE\par \par \par POST INJECTION INSTRUCTIONS:\par \par INJECTION THERAPY HANDOUT PROVIDED\par \par COLD THERAPY ,RICHARD BOWERSN\par \par HOME  EXERCISES QD - PENDULUM AND ROM  HANDOUT PROVIDED, REVIEWED AND DEMONSTRATED - REFERRED TO INSTRUCTIONAL VIDEO ON MY WEBSITE\par \par CONSIDER SECOND INJECTION \par

## 2022-03-31 NOTE — HISTORY OF PRESENT ILLNESS
[de-identified] : RIGHT SHOULDER PAIN- RHD \par PAIN STARTED MARCH 9, 2022- NO SPECIFIC INJURY \par SHARP AND ACHY \par CONSTANT \par RADIATED DOWN ARM \par NUMBNESS AND TINGLING IN ALL FINGERS \par PAIN LEVEL: 7/10\par CLICKING SOUNDS \par BETTER WITH ALEVE, HEATING PAD\par WORSE AT NIGHT, OVER HEAD LIFTING, GETTING DRESSED, REACHING BEHIND, STRETCHING \par

## 2022-03-31 NOTE — PROCEDURE
[de-identified] : DIAGNOSTIC ULTRASOUND RIGHT SHOULDER\par \par DIAGNOSTIC SONOGRAPHY of the Rotator Cuff Soft Tissue of the RIGHT SHOULDER was performed in Multiple Scan Planes with varying transducer frequencies.\par Imaging of the Supraspinatus Tendon reveals TENDONITIS, BURSITIS,  WITH OUT SIGNIFICANT / COMPLETE TEAR\par Imaging of the Biceps Tendon reveals no significant tear.\par Imaging of the Subscapularis Tendon reveals no significant tear.\par Imaging of the Infraspinatus Tendon reveals no significant tear.\par Key images were save digitally and reviewed with patient.\par \par \par INJECTION RIGHT SHOULDER SA SPACE\par \par Patient has demonstrated limited relief from NSAIDS, rest, exercises / PT, and after discussion of the risks and benefits, the patient has elected to proceed with an ULTRASOUND GUIDED injection into the RIGHT SUBACROMIAL  SPACE LATERAL APPROACH \par  \par Confirmed that the patient does not have history of prior adverse reactions, active, infections, or relevant allergies. There was no effusion, erythema, or warmth, and the skin was clear\par \par The skin was sterilized with alcohol. Ethyl Chloride was used as a topical anesthetic. Routine sterile technique. \par The site was injected UTILIZING ULTRASOUND GUIDANCE to confirm appropriate placement of the needle-\par with a mixture of medication and local anesthetic. The injection was completed without complication and a bandage was applied.\par  \par The patient tolerated the procedure well and was given post-injection instructions.Rec: Cold therapy, analgesics, avoid heavy activity.\par MEDICATION: 4cc of 1% xylocaine + 10mg of KENALOG\par \par

## 2023-10-26 NOTE — DISCHARGE NOTE ADULT - ABILITY TO HEAR (WITH HEARING AID OR HEARING APPLIANCE IF NORMALLY USED):
Occupational Therapy Visit    Visit Type: Daily Treatment Note  Visit: 8  Referring Provider: Florin Brown PA-C  Medical Diagnosis (from order): Diagnosis Information    Diagnosis  M25.512 (ICD-10-CM) - Acute pain of left shoulder         SUBJECTIVE                                                                                                               Patient reports reaching behind and extending arm are the most provocative movements.     Pain / Symptoms  - Pain rating (out of 10): Current: 2      OBJECTIVE                                                                                                                                        Treatment     Therapeutic Exercise  UBE level 3.0 x 5 min reverse  Scapular protraction/retraction against wall 2 x 15 reps  Prone lift-off from table hands behind head 2 x 10 reps  Prone Y's with elbow bent 2 x 10 reps  Prone T's with external rotation 2 x 10 reps  Isometric external rotation walk outs with red t-band 2 x 10 reps  Standing scaption with 5# 2 x 10 reps  Standing inferior stretch on table top 2 x 30 sec*     Manual Therapy   Passive motion to the shoulder in all planes of movement with end-range stretching  Side-lying scapular mobilization for scapular retraction/protraction  STM to the pec minor and teres major  Internal rotation with humeral head stabilization from therapist  Grade III posterior/inferior GH mobilizations    Skilled input: posture correction, demonstration, verbal instruction/cues and tactile instruction/cues    Writer verbally educated and received verbal consent for hand placement, positioning of patient, and techniques to be performed today from patient for hand placement and palpation for techniques and therapist position for techniques as described above and how they are pertinent to the patient's plan of care.  Home Exercise Program  Access Code: [email or web address]  URL: [email or web address]  Date:  09/28/2023  Prepared by: Michael Jarrell  Prone scapular retraction 3 x 20 reps  Modified sleeper's stretch in standing 2 x 30 sec  Standing scapular retraction and external rotation with red t-band 2 x 15 reps  Lower trap setting with shoulder extension using yellow t-band 2 x 15 reps      ASSESSMENT                                                                                                            Patient able to better tilt scapula posteriorly during prone strengthening as well and produce scapular protraction and retraction. Resting position of the shoulder is still progressing along with appropriate activation of RTC/scapular stabilizers.  Pain/symptoms after session (out of 10): 3  Education:   - Results of above outlined education: Verbalizes understanding    PLAN                                                                                                                           Suggestions for next session as indicated: Progress per plan of care  UBE  Pulleys  Prone scapular retraction/strengthening  RTC strengthening       Therapy procedure time and total treatment time can be found documented on the Time Entry flowsheet   Adequate: hears normal conversation without difficulty
